# Patient Record
Sex: FEMALE | Race: BLACK OR AFRICAN AMERICAN | NOT HISPANIC OR LATINO | Employment: OTHER | URBAN - METROPOLITAN AREA
[De-identification: names, ages, dates, MRNs, and addresses within clinical notes are randomized per-mention and may not be internally consistent; named-entity substitution may affect disease eponyms.]

---

## 2017-05-01 ENCOUNTER — TELEPHONE (OUTPATIENT)
Dept: OPHTHALMOLOGY | Facility: CLINIC | Age: 67
End: 2017-05-01

## 2017-05-01 NOTE — TELEPHONE ENCOUNTER
----- Message from Sunny Bowman sent at 5/1/2017 11:30 AM CDT -----  Contact: Pancho Menchaca states pt has possible graft rejection and wants pt to be seen, if need to speak with doctor please call [phone] if not call pt at [phone],thanks

## 2017-05-17 ENCOUNTER — OFFICE VISIT (OUTPATIENT)
Dept: OPHTHALMOLOGY | Facility: CLINIC | Age: 67
End: 2017-05-17
Payer: MEDICARE

## 2017-05-17 DIAGNOSIS — H54.40 BLINDNESS, ONE EYE: Primary | ICD-10-CM

## 2017-05-17 DIAGNOSIS — H18.12 BULLOUS KERATOPATHY, LEFT: ICD-10-CM

## 2017-05-17 DIAGNOSIS — T86.8419 CORNEAL TRANSPLANT FAILURE: ICD-10-CM

## 2017-05-17 PROCEDURE — 99999 PR PBB SHADOW E&M-EST. PATIENT-LVL I: CPT | Mod: PBBFAC,,, | Performed by: OPHTHALMOLOGY

## 2017-05-17 PROCEDURE — 92004 COMPRE OPH EXAM NEW PT 1/>: CPT | Mod: S$GLB,,, | Performed by: OPHTHALMOLOGY

## 2017-05-17 RX ORDER — KETOROLAC TROMETHAMINE 5 MG/ML
SOLUTION OPHTHALMIC
COMMUNITY
Start: 2017-05-01

## 2017-05-17 RX ORDER — MOXIFLOXACIN 5 MG/ML
1 SOLUTION/ DROPS OPHTHALMIC
Status: CANCELLED | OUTPATIENT
Start: 2017-05-17

## 2017-05-17 RX ORDER — SITAGLIPTIN 25 MG/1
25 TABLET, FILM COATED ORAL EVERY MORNING
Refills: 3 | COMMUNITY
Start: 2017-05-02

## 2017-05-17 RX ORDER — LIDOCAINE HYDROCHLORIDE 10 MG/ML
1 INJECTION, SOLUTION EPIDURAL; INFILTRATION; INTRACAUDAL; PERINEURAL ONCE
Status: CANCELLED | OUTPATIENT
Start: 2017-05-17 | End: 2017-05-17

## 2017-05-17 RX ORDER — LANCETS 30 GAUGE
EACH MISCELLANEOUS
Refills: 3 | COMMUNITY
Start: 2017-05-02

## 2017-05-17 RX ORDER — OMEPRAZOLE 40 MG/1
40 CAPSULE, DELAYED RELEASE ORAL EVERY MORNING
COMMUNITY
Start: 2017-04-12

## 2017-05-17 RX ORDER — LISINOPRIL 20 MG/1
20 TABLET ORAL DAILY
COMMUNITY
Start: 2017-04-29

## 2017-05-17 RX ORDER — PREDNISOLONE ACETATE 10 MG/ML
SUSPENSION/ DROPS OPHTHALMIC
COMMUNITY
Start: 2017-05-01 | End: 2020-05-28 | Stop reason: SDUPTHER

## 2017-05-17 RX ORDER — DIGOXIN 125 MCG
0.12 TABLET ORAL DAILY
Refills: 3 | COMMUNITY
Start: 2017-02-08 | End: 2019-06-17 | Stop reason: CLARIF

## 2017-05-17 RX ORDER — PIOGLITAZONEHYDROCHLORIDE 15 MG/1
15 TABLET ORAL EVERY MORNING
Refills: 3 | COMMUNITY
Start: 2017-05-02 | End: 2019-06-17 | Stop reason: CLARIF

## 2017-05-17 RX ORDER — DIAPER,BRIEF,ADULT, DISPOSABLE
EACH MISCELLANEOUS
Refills: 3 | COMMUNITY
Start: 2017-04-03

## 2017-05-17 RX ORDER — TETRACAINE HYDROCHLORIDE 5 MG/ML
1 SOLUTION OPHTHALMIC
Status: CANCELLED | OUTPATIENT
Start: 2017-05-17

## 2017-05-17 NOTE — MR AVS SNAPSHOT
[facility] - Ophthalmology  [address]  [facility] [address]  Phone: [phone]  Fax: [phone]                  Willie M Cooks   2017 8:30 AM   Office Visit    Description:  Female : 1950   Provider:  Vishnu Magaña MD   Department:  [facility] - Ophthalmology           Reason for Visit     Eye Problem           Diagnoses this Visit        Comments    Blindness, one eye    -  Primary     Bullous keratopathy, left         Corneal transplant failure                To Do List           Goals (5 Years of Data)     None      [facility]     [facility] On [facility] Care Line -  Assistance  Unless otherwise directed by your provider, please contact [facility], our nurse care line that is available for  assistance.     Registered nurses in the [facility] provide: appointment scheduling, clinical advisement, health education, and other advisory services.  Call: [phone] (toll free)               Medications           Message regarding Medications     Verify the changes and/or additions to your medication regime listed below are the same as discussed with your clinician today.  If any of these changes or additions are incorrect, please notify your healthcare provider.             Verify that the below list of medications is an accurate representation of the medications you are currently taking.  If none reported, the list may be blank. If incorrect, please contact your healthcare provider. Carry this list with you in case of emergency.           Current Medications     amlodipine (NORVASC) 10 MG tablet Take 1 tablet by mouth.    brimonidine (ALPHAGAN) 0.2 % ophthalmic solution Place 1 drop into both eyes 2 (two) times daily.     digoxin (LANOXIN) 125 mcg tablet Take 0.125 mg by mouth once daily.    digoxin (LANOXIN) 250 mcg tablet     dorzolamide-timolol (COSOPT) 2-0.5 % ophthalmic solution Place 1 drop into both eyes 2 (two) times daily.     ergocalciferol  (DRISDOL) 8,000 unit/mL Drop Take by mouth once daily.      glipiZIDE (GLUCOTROL) 10 MG 24 hr tablet     JANUVIA 25 mg Tab Take 25 mg by mouth every morning.    ketorolac 0.5% (ACULAR) 0.5 % Drop     latanoprost (XALATAN) 0.005 % ophthalmic solution Place 1 drop into both eyes every evening.     LIPITOR 20 mg tablet     lisinopril (PRINIVIL,ZESTRIL) 20 MG tablet     lisinopril 10 MG tablet     metoprolol tartrate (LOPRESSOR) 50 MG tablet     omeprazole (PRILOSEC) 40 MG capsule     pioglitazone (ACTOS) 15 MG tablet Take 15 mg by mouth every morning.    prednisoLONE acetate (PRED FORTE) 1 % DrpS     sodium chloride (SUJATA 128) 5 % ophthalmic solution Place 1 drop into the left eye 2 (two) times daily.     SURE COMFORT LANCETS 30 gauge Misc USE AS DIRECTED    TRUETRACK TEST Strp TEST BLOOD SUGAR TWICE APPLY DAY           Clinical Reference Information           Allergies as of 5/17/2017     No Known Allergies      Immunizations Administered on Date of Encounter - 5/17/2017     None      [facility] Sign-Up     Activating your [facility] account is as easy as 1-2-3!     1) Visit [email or web address], select Sign Up Now, enter this activation code and your date of birth, then select Next.  [email or web address]  Expires: 7/1/2017  8:55 AM      2) Create a username and password to use when you visit [facility] in the future and select a security question in case you lose your password and select Next.    3) Enter your e-mail address and click Sign Up!    Additional Information  If you have questions, please e-mail [email or web address] or call [phone] to talk to our [facility] staff. Remember, [facility] is NOT to be used for urgent needs. For medical emergencies, dial 911.         Language Assistance Services     ATTENTION: Language assistance services are available, free of charge. Please call [phone].      ATENCIÓN: Si habla español, tiene a eller disposición servicios gratuitos de asistencia lingüística. Llame al  [phone].     MARY Ý: N?u b?n nói Ti?ng Vi?t, có các d?ch v? h? tr? ngôn ng? mi?n phí dành cho b?n. G?i s? [phone].         [facility] complies with applicable Federal civil rights laws and does not discriminate on the basis of race, color, national origin, age, disability, or sex.

## 2017-05-17 NOTE — PROGRESS NOTES
HPI     Eye Problem    Additional comments: Referred by Dr. Bernarda           Comments   DLS 1/2/13  Dr. Magaña    Pt states has had a sudden decrease in VA OS.  Seemed to be doing ok,   until a couple of months ago.  Unable to see much out of OS at all.    Denies pain, tearing or sensitivity to bright light    1. Bullous keratopathy OS  -s/p DSEK OS 7/2012  2. Blindness OD  3. Glaucoma OU    Eye Meds:  Brimonidine BID OU  Cosopt BID OU  Latanoprost QHS OU  PF QID OS  Ketorolac QID OS (this one burns)  Unsure if using [address] 128        Last edited by Ammy Clemente on 5/17/2017  8:33 AM. (History)            Assessment /Plan     For exam results, see Encounter Report.    Blindness, one eye - Right Eye    Bullous keratopathy, left    Corneal transplant failure    Failed DSEK graft from 2012 with 2 tubes in AC.    Clinically significant corneal edema is present, which affects vision and activities of daily living. Risks, benefits, and alternatives to surgery were discussed and patient voices understanding.    Repeat DSEK left eye

## 2017-06-08 ENCOUNTER — TELEPHONE (OUTPATIENT)
Dept: OPHTHALMOLOGY | Facility: CLINIC | Age: 67
End: 2017-06-08

## 2017-06-08 DIAGNOSIS — T86.8419 CORNEAL TRANSPLANT FAILURE: Primary | ICD-10-CM

## 2017-06-26 NOTE — PLAN OF CARE
06/26/17 1110   ED Admissions Case Approval   ED Admissions Case Approval (!) CM Approved  (OP )

## 2017-07-06 ENCOUNTER — ANESTHESIA (OUTPATIENT)
Dept: SURGERY | Facility: OTHER | Age: 67
End: 2017-07-06
Payer: MEDICARE

## 2017-07-06 ENCOUNTER — HOSPITAL ENCOUNTER (OUTPATIENT)
Facility: OTHER | Age: 67
Discharge: HOME | End: 2017-07-06
Attending: OPHTHALMOLOGY | Admitting: OPHTHALMOLOGY
Payer: MEDICARE

## 2017-07-06 ENCOUNTER — ANESTHESIA EVENT (OUTPATIENT)
Dept: SURGERY | Facility: OTHER | Age: 67
End: 2017-07-06
Payer: MEDICARE

## 2017-07-06 VITALS
OXYGEN SATURATION: 95 % | HEART RATE: 81 BPM | TEMPERATURE: 99 F | RESPIRATION RATE: 18 BRPM | DIASTOLIC BLOOD PRESSURE: 69 MMHG | WEIGHT: 175 LBS | BODY MASS INDEX: 27.47 KG/M2 | SYSTOLIC BLOOD PRESSURE: 127 MMHG | HEIGHT: 67 IN

## 2017-07-06 DIAGNOSIS — T86.8419 CORNEAL TRANSPLANT FAILURE: ICD-10-CM

## 2017-07-06 DIAGNOSIS — H54.40 BLINDNESS, ONE EYE: ICD-10-CM

## 2017-07-06 DIAGNOSIS — H18.12 BULLOUS KERATOPATHY, LEFT: ICD-10-CM

## 2017-07-06 LAB
POCT GLUCOSE: 141 MG/DL (ref 70–110)
POCT GLUCOSE: 183 MG/DL (ref 70–110)

## 2017-07-06 PROCEDURE — 71000015 HC POSTOP RECOV 1ST HR: Performed by: OPHTHALMOLOGY

## 2017-07-06 PROCEDURE — 37000009 HC ANESTHESIA EA ADD 15 MINS: Performed by: OPHTHALMOLOGY

## 2017-07-06 PROCEDURE — 63600175 PHARM REV CODE 636 W HCPCS: Performed by: ANESTHESIOLOGY

## 2017-07-06 PROCEDURE — 65757 PREP CORNEAL ENDO ALLOGRAFT: CPT | Mod: LT,,, | Performed by: OPHTHALMOLOGY

## 2017-07-06 PROCEDURE — 36000707: Performed by: OPHTHALMOLOGY

## 2017-07-06 PROCEDURE — 63600175 PHARM REV CODE 636 W HCPCS: Performed by: OPHTHALMOLOGY

## 2017-07-06 PROCEDURE — 63600175 PHARM REV CODE 636 W HCPCS: Performed by: NURSE ANESTHETIST, CERTIFIED REGISTERED

## 2017-07-06 PROCEDURE — 71000016 HC POSTOP RECOV ADDL HR: Performed by: OPHTHALMOLOGY

## 2017-07-06 PROCEDURE — 25000003 PHARM REV CODE 250: Performed by: OPHTHALMOLOGY

## 2017-07-06 PROCEDURE — 36000706: Performed by: OPHTHALMOLOGY

## 2017-07-06 PROCEDURE — 27201423 OPTIME MED/SURG SUP & DEVICES STERILE SUPPLY: Performed by: OPHTHALMOLOGY

## 2017-07-06 PROCEDURE — V2785 CORNEAL TISSUE PROCESSING: HCPCS | Performed by: OPHTHALMOLOGY

## 2017-07-06 PROCEDURE — 65756 CORNEAL TRNSPL ENDOTHELIAL: CPT | Mod: LT,,, | Performed by: OPHTHALMOLOGY

## 2017-07-06 PROCEDURE — 37000008 HC ANESTHESIA 1ST 15 MINUTES: Performed by: OPHTHALMOLOGY

## 2017-07-06 RX ORDER — MIDAZOLAM HYDROCHLORIDE 1 MG/ML
INJECTION INTRAMUSCULAR; INTRAVENOUS
Status: DISCONTINUED | OUTPATIENT
Start: 2017-07-06 | End: 2017-07-06

## 2017-07-06 RX ORDER — LIDOCAINE HYDROCHLORIDE 10 MG/ML
1 INJECTION, SOLUTION EPIDURAL; INFILTRATION; INTRACAUDAL; PERINEURAL ONCE
Status: DISCONTINUED | OUTPATIENT
Start: 2017-07-06 | End: 2017-07-06 | Stop reason: HOSPADM

## 2017-07-06 RX ORDER — CEFAZOLIN SODIUM 1 G/3ML
INJECTION, POWDER, FOR SOLUTION INTRAMUSCULAR; INTRAVENOUS
Status: DISCONTINUED | OUTPATIENT
Start: 2017-07-06 | End: 2017-07-06 | Stop reason: HOSPADM

## 2017-07-06 RX ORDER — ACETAMINOPHEN 325 MG/1
650 TABLET ORAL EVERY 4 HOURS PRN
Status: DISCONTINUED | OUTPATIENT
Start: 2017-07-06 | End: 2017-07-06 | Stop reason: HOSPADM

## 2017-07-06 RX ORDER — NAPROXEN SODIUM 220 MG/1
81 TABLET, FILM COATED ORAL DAILY
COMMUNITY

## 2017-07-06 RX ORDER — LIDOCAINE HYDROCHLORIDE 20 MG/ML
INJECTION, SOLUTION INFILTRATION; PERINEURAL
Status: DISCONTINUED | OUTPATIENT
Start: 2017-07-06 | End: 2017-07-06 | Stop reason: HOSPADM

## 2017-07-06 RX ORDER — BUPIVACAINE HYDROCHLORIDE 7.5 MG/ML
INJECTION, SOLUTION EPIDURAL; RETROBULBAR
Status: DISCONTINUED | OUTPATIENT
Start: 2017-07-06 | End: 2017-07-06 | Stop reason: HOSPADM

## 2017-07-06 RX ORDER — MOXIFLOXACIN 5 MG/ML
1 SOLUTION/ DROPS OPHTHALMIC
Status: COMPLETED | OUTPATIENT
Start: 2017-07-06 | End: 2017-07-06

## 2017-07-06 RX ORDER — HYDROCODONE BITARTRATE AND ACETAMINOPHEN 5; 325 MG/1; MG/1
1 TABLET ORAL EVERY 4 HOURS PRN
Status: DISCONTINUED | OUTPATIENT
Start: 2017-07-06 | End: 2017-07-06 | Stop reason: HOSPADM

## 2017-07-06 RX ORDER — ONDANSETRON 2 MG/ML
4 INJECTION INTRAMUSCULAR; INTRAVENOUS ONCE
Status: COMPLETED | OUTPATIENT
Start: 2017-07-06 | End: 2017-07-06

## 2017-07-06 RX ORDER — FENTANYL CITRATE 50 UG/ML
INJECTION, SOLUTION INTRAMUSCULAR; INTRAVENOUS
Status: DISCONTINUED | OUTPATIENT
Start: 2017-07-06 | End: 2017-07-06

## 2017-07-06 RX ORDER — TETRACAINE HYDROCHLORIDE 5 MG/ML
1 SOLUTION OPHTHALMIC
Status: COMPLETED | OUTPATIENT
Start: 2017-07-06 | End: 2017-07-06

## 2017-07-06 RX ADMIN — ONDANSETRON 4 MG: 2 INJECTION INTRAMUSCULAR; INTRAVENOUS at 01:07

## 2017-07-06 RX ADMIN — TETRACAINE HYDROCHLORIDE 1 DROP: 5 SOLUTION OPHTHALMIC at 10:07

## 2017-07-06 RX ADMIN — MOXIFLOXACIN HYDROCHLORIDE 1 DROP: 5 SOLUTION/ DROPS OPHTHALMIC at 10:07

## 2017-07-06 RX ADMIN — MIDAZOLAM HYDROCHLORIDE 2 MG: 1 INJECTION, SOLUTION INTRAMUSCULAR; INTRAVENOUS at 11:07

## 2017-07-06 RX ADMIN — FENTANYL CITRATE 50 MCG: 50 INJECTION, SOLUTION INTRAMUSCULAR; INTRAVENOUS at 11:07

## 2017-07-06 RX ADMIN — FENTANYL CITRATE 25 MCG: 50 INJECTION, SOLUTION INTRAMUSCULAR; INTRAVENOUS at 11:07

## 2017-07-06 NOTE — ANESTHESIA PREPROCEDURE EVALUATION
07/06/2017  Willie Babin Cooks is a 67 y.o., female.    Anesthesia Evaluation    I have reviewed the Patient Summary Reports.    I have reviewed the Nursing Notes.   I have reviewed the Medications.     Review of Systems  Anesthesia Hx:  Denies Family Hx of Anesthesia complications.   Denies Personal Hx of Anesthesia complications.   Social:  Non-Smoker    Hematology/Oncology:     Oncology Normal     Cardiovascular:   Exercise tolerance: poor Hypertension CHF    Pulmonary:  Pulmonary Normal    Hepatic/GI:   GERD    Neurological:  Neurology Normal    Endocrine:   Diabetes        Physical Exam  General:  Obesity    Airway/Jaw/Neck:  Airway Findings: Mouth Opening: Normal Tongue: Normal  General Airway Assessment: Adult      Dental:  Dental Findings: Edentulous        Mental Status:  Mental Status Findings:  Alert and Oriented, Cooperative         Anesthesia Plan  Type of Anesthesia, risks & benefits discussed:  Anesthesia Type:  MAC  Patient's Preference:   Intra-op Monitoring Plan:   Intra-op Monitoring Plan Comments:   Post Op Pain Control Plan:   Post Op Pain Control Plan Comments:   Induction:    Beta Blocker:         Informed Consent: Patient understands risks and agrees with Anesthesia plan.  Questions answered. Anesthesia consent signed with patient.  ASA Score: 3     Day of Surgery Review of History & Physical:    H&P update referred to the surgeon.         Ready For Surgery From Anesthesia Perspective.

## 2017-07-06 NOTE — H&P
Pre-op Eye Surgery H&P     CC: Painless, progressive loss of vision  Present Illness :Corneal edema/opacity  Allergies/Current Meds: see meds  Mental Status: A&O x3  Pertinent Medical History: n/a     Physical Exam  General: NAD  HEENT: Eye white/quiet  Lungs: Adequate respirations  Heart: + pulses  Abdomen: soft  Rectal/GI/: deferred     Impression: Corneal Edema/opacity  Plan:Corneal Transplant

## 2017-07-06 NOTE — PLAN OF CARE
Patient prefers to have Daughter Celsa present for discharge teaching. Please contact them @[phone].

## 2017-07-06 NOTE — ANESTHESIA POSTPROCEDURE EVALUATION
"Anesthesia Post Evaluation    Patient: Willie Babin Cooks    Procedure(s) Performed: Procedure(s) (LRB):  TRANSPLANT-CORNEA/DSEK (Left)    Final Anesthesia Type: MAC  Patient location during evaluation: [facility]  Patient participation: Yes- Able to Participate  Level of consciousness: awake and alert  Post-procedure vital signs: reviewed and stable  Pain management: adequate  Airway patency: patent  PONV status at discharge: No PONV  Anesthetic complications: no      Cardiovascular status: blood pressure returned to baseline  Respiratory status: unassisted and spontaneous ventilation  Hydration status: euvolemic  Follow-up not needed.        Visit Vitals  BP (!) 140/76   Pulse 76   Temp 37.2 °C (99 °F) (Oral)   Resp 16   Ht 5' 7" (1.702 m)   Wt 79.4 kg (175 lb)   SpO2 99%   Breastfeeding? No   BMI 27.41 kg/m²       Pain/Nayeli Score: Pain Assessment Performed: Yes (7/6/2017 10:10 AM)      "

## 2017-07-06 NOTE — OR NURSING
Patient nauseous. Spoke with MD and informed him that we briefly sat patient up; states okay. MD at bedside. BSG of 183. States feels better.

## 2017-07-06 NOTE — DISCHARGE INSTRUCTIONS
Vishnu Magaña MD  [facility]  Department of Ophthalmology    DSEK CORNEAL TRANSPLANT Post-Operative Instructions:  Remain lying on your back , facing the ceiling for the first 48 hours after surgery, except for necessary breaks such as eating meals and restroom breaks.  AVOID watching TV or any sudden jerking movements of your head  If you experience pain or severe headache behind the eye with nausea, call Dr. Magaña or the on-call doctor IMMEDIATELY @ [phone].    Plan to see Dr. Magaña tomorrow at the eye clinic:   [address] [facility],[address]     STARTING Tomorrow following your post-operative appointment with Dr. Magaña:   Place 1 (one) drop of each of the medications into the operative eye as directed, wait 2 (two) minutes between drops.   Remove the shield covering from your eye temporarily to instill the drops.     SHAKE BOTTLES WELL BEFORE USE:    OCUFLOX/ VIGAMOX:       4 (four) times a day  FOR 1 (one) WEEK.                PRED ACETATE:        4 (four) times a day for 1 (one)  month                  Precautions:  DO NOT rub your eye.  Remain on your back for the next 24 hours.   Do NOT exert yourself ( no heavy lifting, running, or swimming)  for 1 (One) Month.  You may shower, but do not allow water into your eye for 2 (two) weeks.  Wear protective sunglasses during the day and a shield at night for 1(one) week.      Anesthesia: Monitored Anesthesia Care (MAC)    Anesthesia Safety  · Have an adult family member or friend drive you home after the procedure.  · For the first 24 hours after your surgery:  ¨ Do not drive or use heavy equipment.  ¨ Do not make important decisions or sign documents.  ¨ Avoid alcohol.  ¨ Have someone stay with you, if possible. They can watch for problems and help keep you safe.

## 2017-07-06 NOTE — OP NOTE
SURGEON:  Vishnu Magaña M.D.    PREOPERATIVE DIAGNOSES:  Corneal edema  Failed DSEK OS    POSTOPERATIVE DIAGNOSES:    Corneal edema  Failed DSEK OS    PROCEDURES PERFORMED:  Descement's Stripping Endothelial Keratoplasty  left eye (DSEK) (45304)    07/06/2017    ANESTHESIA:  MAC with retrobulbar block.    GRAFT SIZE:  7.5 mm    COMPLICATIONS:  None.    INDICATIONS:    The patient has a history poor vision secondary to corneal edema.  After a thorough discussion of the risks, benefits and alternatives to corneal transplantation using the DSEK technique, the patient voices understanding of the risks and benefits and wishes to proceed with surgery.    PROCEDURE IN DETAIL:    The patient was brought to the operating room in the supine position, where the eye was prepped and draped in standard sterile fashion, after having received a retrobulbar block consisting of a 50/50 mixture of lidocaine and bupivacaine under conscious sedation.  The procedure was begun by the creation of a paracentesis incision, through which viscoelastic was used to fill the anterior chamber.    Next, a 4mm temporal limbal tunnel incision was constructed.  An 8 mm iliana was made in the center of the cornea and then reverse Sinskey used to remove the old graft.  A scraper was then used to remove Descemet's in its entirety and all remaining viscoelastics were removed from the eye.    Attention was then directed to the side table, where the previously microkeratome cut donor tissue was trephined with a Hessburg-Snider trephine.  Attention was then redirected to the patient's eye and this corneal transplant tissue was   inserted into the anterior chamber using an injector.  The anterior chamber was reformed with BSS and 10-0 nylon sutures placed in the wound.  The DSEK button was repositioned centrally and air used to fill the anterior chamber.  Ten minutes were allowed to elapse for adherence, and then the air was replaced with BSS except for a small  air bubble.  The patient will remain supine in the postoperative recovery area for one hour to allow better adhesion of the graft to the posterior aspect of the cornea.  The patient  will be seen tomorrow in the eye clinic.

## 2017-07-06 NOTE — DISCHARGE SUMMARY
Outcome: Successful outpatient ophthalmic surgical procedure  Preprinted Instructions given to patient.  Regular diet.  Activity: No restrictions  Meds: see Med Rec  Condition: stable  Follow up: 1 day with Dr Magaña  Disposition: Home  Diagnosis: s/p eye surgery

## 2017-07-07 ENCOUNTER — OFFICE VISIT (OUTPATIENT)
Dept: OPHTHALMOLOGY | Facility: CLINIC | Age: 67
End: 2017-07-07
Payer: MEDICARE

## 2017-07-07 DIAGNOSIS — Z94.7 STATUS POST CORNEAL TRANSPLANT: ICD-10-CM

## 2017-07-07 DIAGNOSIS — Z98.890 POST-OPERATIVE STATE: Primary | ICD-10-CM

## 2017-07-07 PROCEDURE — 99999 PR PBB SHADOW E&M-EST. PATIENT-LVL III: CPT | Mod: PBBFAC,,, | Performed by: OPHTHALMOLOGY

## 2017-07-07 PROCEDURE — 99024 POSTOP FOLLOW-UP VISIT: CPT | Mod: S$GLB,,, | Performed by: OPHTHALMOLOGY

## 2017-07-07 NOTE — PROGRESS NOTES
HPI     Post-op Evaluation    Additional comments: POD 1 DSEK OS           Comments   POD 1 DSEK OS July 6, 2017    Patient presented to clinic with OS patched.  Patched removed at 9:24 AM.    Patch removal tolerated well.    No eye meds at this time; but patient has medications and is ready to   start regimen           Last edited by Jesika Simmons on 7/7/2017  9:24 AM. (History)            Assessment /Plan     For exam results, see Encounter Report.    Post-operative state    Status post corneal transplant      POD 1 DSEK OS July 6, 2017    Doing well, graft attached    Restart glc gtts as before, add PF / vigamox QID    F/up 1 wk Dr. Magaña

## 2017-07-12 ENCOUNTER — OFFICE VISIT (OUTPATIENT)
Dept: OPHTHALMOLOGY | Facility: CLINIC | Age: 67
End: 2017-07-12
Payer: MEDICARE

## 2017-07-12 DIAGNOSIS — Z98.890 POST-OPERATIVE STATE: Primary | ICD-10-CM

## 2017-07-12 PROCEDURE — 99024 POSTOP FOLLOW-UP VISIT: CPT | Mod: S$GLB,,, | Performed by: OPHTHALMOLOGY

## 2017-07-12 PROCEDURE — 99999 PR PBB SHADOW E&M-EST. PATIENT-LVL II: CPT | Mod: PBBFAC,,, | Performed by: OPHTHALMOLOGY

## 2017-07-12 NOTE — PROGRESS NOTES
HPI     POW 1 DSEK OS July 6, 2017    MEDS:    PF QID OS  Vigamox QID OS    Patient states she is doing well with no complaints.      Last edited by Jesika Simmons on 7/12/2017 11:03 AM. (History)            Assessment /Plan     For exam results, see Encounter Report.    Post-operative state      DSEK graft attached and clear. Signs and symptoms of graft rejection reviewed.  1 week  Looks great.  PF qid

## 2017-08-08 NOTE — PROGRESS NOTES
HPI     Post-op Evaluation    Additional comments: POD 1 DSEK OS           Comments   POD 1 DSEK OS July 6, 2017    Patient presented to clinic with OS patched.  Patched removed at 9:24 AM.    Patch removal tolerated well.    No eye meds at this time; but patient has medications and is ready to   start regimen           Last edited by Jesika Simmons on 7/7/2017  9:24 AM. (History)            Assessment /Plan     For exam results, see Encounter Report.    Post-operative state    Status post corneal transplant        HPI     Post-op Evaluation    Additional comments: POD 1 DSEK OS           Comments   POD 1 DSEK OS July 6, 2017     Patient presented to clinic with OS patched.  Patched removed at 9:24 AM.    Patch removal tolerated well.     No eye meds at this time; but patient has medications and is ready to   start regimen            Last edited by Jesika Simmons on 7/7/2017  9:24 AM. (History)               Assessment /Plan      For exam results, see Encounter Report.     Post-operative state     Status post corneal transplant        POD 1 DSEK OS July 6, 2017     Doing well, graft attached     Restart glc gtts as before, add PF / vigamox QID     F/up 1 wk Dr. Magaña

## 2017-09-06 ENCOUNTER — OFFICE VISIT (OUTPATIENT)
Dept: OPHTHALMOLOGY | Facility: CLINIC | Age: 67
End: 2017-09-06
Payer: MEDICARE

## 2017-09-06 DIAGNOSIS — H54.40 BLINDNESS, ONE EYE: ICD-10-CM

## 2017-09-06 DIAGNOSIS — Z98.890 POST-OPERATIVE STATE: Primary | ICD-10-CM

## 2017-09-06 DIAGNOSIS — Z94.7 STATUS POST CORNEAL TRANSPLANT: ICD-10-CM

## 2017-09-06 PROCEDURE — 92014 COMPRE OPH EXAM EST PT 1/>: CPT | Mod: 24,S$GLB,, | Performed by: OPHTHALMOLOGY

## 2017-09-06 PROCEDURE — 99999 PR PBB SHADOW E&M-EST. PATIENT-LVL III: CPT | Mod: PBBFAC,,, | Performed by: OPHTHALMOLOGY

## 2017-09-06 RX ORDER — BROMFENAC 1.03 MG/ML
1 SOLUTION/ DROPS OPHTHALMIC 2 TIMES DAILY
Qty: 2.5 ML | Refills: 3 | Status: SHIPPED | OUTPATIENT
Start: 2017-09-06 | End: 2017-10-06

## 2017-09-06 NOTE — PROGRESS NOTES
HPI     Post-op Evaluation    Additional comments: 6 wk check.            Comments   S/p Repeat DSEK OS 07/06/2017  Hx of Bullous Keratopathy and Failed Graft OS    Pt states vision OS is still blurry.  Saw Dr Diaz recently who put her on   Ketorolac but she is not using because it burns.      MEDS:    PF QID OS  Brimonidine bid OU  Latanoprost qhs OS  Cosopt bid OS     Patient states she is doing well with no complaints.         Last edited by Usha Longoria on 9/6/2017  9:03 AM. (History)            Assessment /Plan     For exam results, see Encounter Report.    Post-operative state    Status post corneal transplant    Blindness, one eye - Right Eye    Other orders  -     nepafenac (ILEVRO) 0.3 % DrpS; Place 1 drop into the left eye once daily. For 30 days  Dispense: 1.7 mL; Refill: 3  -     bromfenac (XIBROM) 0.09 % ophthalmic solution; Place 1 drop into the left eye 2 (two) times daily.  Dispense: 2.5 mL; Refill: 3  -     nepafenac (NEVANAC) 0.1 % ophthalmic suspension; Place 1 drop into the left eye 3 (three) times daily.  Dispense: 6 mL; Refill: 3      DSEK graft attached and clear. Signs and symptoms of graft rejection reviewed.  Excellent result. LImited potential.  Continue current treatment/medications

## 2018-01-22 ENCOUNTER — TELEPHONE (OUTPATIENT)
Dept: OPHTHALMOLOGY | Facility: CLINIC | Age: 68
End: 2018-01-22

## 2018-01-22 NOTE — TELEPHONE ENCOUNTER
----- Message from Sally Mcconnell sent at 1/22/2018 10:58 AM CST -----  Contact: Celsa Wong(Daughter)  Pt called to speak with a nurse about her mother's current condition. The RIGHT EYE has continued  blurred vision. May need prescription changes. Ms. Wong can be reached at [phone] and mother can be reached at [phone].

## 2018-01-22 NOTE — TELEPHONE ENCOUNTER
I spoke to Ms Celsa.  She states that the left eye has been watering and vision is blurry for some time.  Appointment scheduled for evaluation with Dr Magaña for 02/02 at 930 am.

## 2018-02-02 ENCOUNTER — OFFICE VISIT (OUTPATIENT)
Dept: OPHTHALMOLOGY | Facility: CLINIC | Age: 68
End: 2018-02-02
Payer: MEDICARE

## 2018-02-02 DIAGNOSIS — H54.40 BLINDNESS, ONE EYE: Primary | ICD-10-CM

## 2018-02-02 DIAGNOSIS — Z94.7 STATUS POST CORNEAL TRANSPLANT: ICD-10-CM

## 2018-02-02 PROCEDURE — 92014 COMPRE OPH EXAM EST PT 1/>: CPT | Mod: S$GLB,,, | Performed by: OPHTHALMOLOGY

## 2018-02-02 PROCEDURE — 99999 PR PBB SHADOW E&M-EST. PATIENT-LVL II: CPT | Mod: PBBFAC,,, | Performed by: OPHTHALMOLOGY

## 2018-02-02 NOTE — PROGRESS NOTES
HPI     Eye Problem    Additional comments: Left            Comments   /p Repeat DSEK OS 07/06/2017   Hx of Bullous Keratopathy and Failed Graft OS     Pt states OS has been watering, painful off and on, and blurred vision for   some time.  Sees Dr Diaz and Dr Winston and they have decreased Pred to   once daily but unsure when this change occurred.         Brimonidine bid OU   Pred qd OS -   Cosopt (generic) bid OU   Latanoprost qhs OU        Last edited by Usha Longoria on 2/2/2018  9:29 AM. (History)            Assessment /Plan     For exam results, see Encounter Report.    Blindness, one eye - Right Eye    Status post corneal transplant      DSEK graft attached and clear. Signs and symptoms of graft rejection reviewed.  Optic nerve pale.  IOP stable.

## 2018-11-16 ENCOUNTER — OFFICE VISIT (OUTPATIENT)
Dept: OPHTHALMOLOGY | Facility: CLINIC | Age: 68
End: 2018-11-16
Payer: MEDICARE

## 2018-11-16 DIAGNOSIS — T86.8419 CORNEAL TRANSPLANT FAILURE: ICD-10-CM

## 2018-11-16 DIAGNOSIS — H54.40 BLINDNESS, ONE EYE: Primary | ICD-10-CM

## 2018-11-16 PROCEDURE — 99999 PR PBB SHADOW E&M-EST. PATIENT-LVL II: CPT | Mod: PBBFAC,,, | Performed by: OPHTHALMOLOGY

## 2018-11-16 PROCEDURE — 92014 COMPRE OPH EXAM EST PT 1/>: CPT | Mod: S$GLB,,, | Performed by: OPHTHALMOLOGY

## 2018-11-16 RX ORDER — PREDNISOLONE ACETATE 10 MG/ML
1 SUSPENSION/ DROPS OPHTHALMIC 4 TIMES DAILY
Qty: 10 ML | Refills: 4 | Status: SHIPPED | OUTPATIENT
Start: 2018-11-16 | End: 2019-05-01 | Stop reason: SDUPTHER

## 2018-11-16 NOTE — PROGRESS NOTES
HPI     Decreased Visual Acuity      Additional comments: Left Eye.               Comments     67 y/o female presents for corneal graft rejection per Dr Menchaca.   Pt states vision OS is very cloudy.  Has been for some time.  Likely since   the summer.  Had been using Pred once daily but saw Dr Rodrigues last   week who increased it to q2h.  Pt notes no change in vision since that   exam.    Pred q2h OS  Brimonidine tid OU  Latanoprost qhs OU   Dorzolamide bid OU           Last edited by Usha Longoria on 11/16/2018  8:51 AM. (History)            Assessment /Plan     For exam results, see Encounter Report.    Blindness, one eye - Right Eye    Corneal transplant failure      Advanced glaucoma   s/p DSEK OS 7.17  Moderate graft edema, but holding for now.  Decreased cell count, but monitor for now,   Would likely need PKP if repeat required.

## 2019-01-16 ENCOUNTER — OFFICE VISIT (OUTPATIENT)
Dept: OPHTHALMOLOGY | Facility: CLINIC | Age: 69
End: 2019-01-16
Payer: MEDICARE

## 2019-01-16 DIAGNOSIS — H54.40 BLINDNESS, ONE EYE: Primary | ICD-10-CM

## 2019-01-16 DIAGNOSIS — T86.8419 CORNEAL TRANSPLANT FAILURE: ICD-10-CM

## 2019-01-16 PROCEDURE — 92014 PR EYE EXAM, EST PATIENT,COMPREHESV: ICD-10-PCS | Mod: S$GLB,,, | Performed by: OPHTHALMOLOGY

## 2019-01-16 PROCEDURE — 99999 PR PBB SHADOW E&M-EST. PATIENT-LVL II: CPT | Mod: PBBFAC,,, | Performed by: OPHTHALMOLOGY

## 2019-01-16 PROCEDURE — 92014 COMPRE OPH EXAM EST PT 1/>: CPT | Mod: S$GLB,,, | Performed by: OPHTHALMOLOGY

## 2019-01-16 PROCEDURE — 99999 PR PBB SHADOW E&M-EST. PATIENT-LVL II: ICD-10-PCS | Mod: PBBFAC,,, | Performed by: OPHTHALMOLOGY

## 2019-01-16 NOTE — PROGRESS NOTES
"HPI     DLS: 11/16/18    PT here for 2 month check;  Pt states she went to see her eye doctor in [facility] last week and he   stated that her pressures were fine and that the cause of decreased vision   is the transplant. Pt states vision is just "white".     Meds: Brimonidine bid ou             Dorzolamide-Timolol bid ou             Prednisolone qid os              Latanoprost qhs ou      Last edited by Stephanie Gaffney on 1/16/2019  8:36 AM. (History)            Assessment /Plan     For exam results, see Encounter Report.    Blindness, one eye - Right Eye    Corneal transplant failure      Progressive DSEK failure OS with inferior fibrosis from Tube.  Will plan on PKP OS in near future. Currently, still some useful vision through center of graft.  Likely will plan PKP OS in summer/fall.   Recheck in 2-3 mos.                   "

## 2019-01-16 NOTE — PATIENT INSTRUCTIONS
Progressive DSEK failure OS with inferior fibrosis from Tube.  Will plan on PKP OS in near future. Currently, still some useful vision through center of graft.  Likely will plan PKP OS in summer/fall.   Recheck in 2-3 mos.

## 2019-01-18 ENCOUNTER — TELEPHONE (OUTPATIENT)
Dept: OPHTHALMOLOGY | Facility: CLINIC | Age: 69
End: 2019-01-18

## 2019-01-18 NOTE — TELEPHONE ENCOUNTER
----- Message from Randa Cornell sent at 1/18/2019 10:19 AM CST -----  Contact: Cooks,Willie Babin   Pt daughter   would like to speak with  nurse please ,pt has a questions, she can be reached at [phone] please thank you.

## 2019-01-18 NOTE — TELEPHONE ENCOUNTER
Informed patient's daughter I will send in a referral to [facility]. Also explained it may take up to a week or two for them to get in touch with them. Daughter indicates understanding.

## 2019-04-22 ENCOUNTER — TELEPHONE (OUTPATIENT)
Dept: OPHTHALMOLOGY | Facility: CLINIC | Age: 69
End: 2019-04-22

## 2019-04-22 NOTE — TELEPHONE ENCOUNTER
Pt's daughter states her mom's vision is getting progressively worse. Made appt earlier than first available.

## 2019-04-22 NOTE — TELEPHONE ENCOUNTER
----- Message from Randa Cornell sent at 4/22/2019  4:22 PM CDT -----  Contact: Cooks,Willie Babin   Pt daughter Celsa Green would like to speak with  nurse,to rescheduled the appointment please ,she can be reached at [phone] or cell# [phone]  please thank you.

## 2019-05-01 ENCOUNTER — OFFICE VISIT (OUTPATIENT)
Dept: OPHTHALMOLOGY | Facility: CLINIC | Age: 69
End: 2019-05-01
Payer: MEDICARE

## 2019-05-01 DIAGNOSIS — H54.40 BLINDNESS, ONE EYE: ICD-10-CM

## 2019-05-01 DIAGNOSIS — T86.8419 CORNEAL TRANSPLANT FAILURE: Primary | ICD-10-CM

## 2019-05-01 PROCEDURE — 92014 COMPRE OPH EXAM EST PT 1/>: CPT | Mod: S$GLB,,, | Performed by: OPHTHALMOLOGY

## 2019-05-01 PROCEDURE — 99999 PR PBB SHADOW E&M-EST. PATIENT-LVL II: CPT | Mod: PBBFAC,,, | Performed by: OPHTHALMOLOGY

## 2019-05-01 PROCEDURE — 92014 PR EYE EXAM, EST PATIENT,COMPREHESV: ICD-10-PCS | Mod: S$GLB,,, | Performed by: OPHTHALMOLOGY

## 2019-05-01 PROCEDURE — 99999 PR PBB SHADOW E&M-EST. PATIENT-LVL II: ICD-10-PCS | Mod: PBBFAC,,, | Performed by: OPHTHALMOLOGY

## 2019-05-01 RX ORDER — LORATADINE 10 MG/1
TABLET ORAL
Refills: 10 | COMMUNITY
Start: 2019-03-26 | End: 2019-06-17 | Stop reason: CLARIF

## 2019-05-01 RX ORDER — TIMOLOL MALEATE 5 MG/ML
SOLUTION/ DROPS OPHTHALMIC
Refills: 3 | COMMUNITY
Start: 2019-02-28

## 2019-05-01 RX ORDER — ERGOCALCIFEROL 1.25 MG/1
50000 CAPSULE ORAL
Refills: 3 | COMMUNITY
Start: 2019-03-08

## 2019-05-01 RX ORDER — PREDNISOLONE ACETATE 10 MG/ML
1 SUSPENSION/ DROPS OPHTHALMIC 4 TIMES DAILY
Qty: 10 ML | Refills: 4 | Status: ON HOLD | OUTPATIENT
Start: 2019-05-01 | End: 2019-06-20 | Stop reason: SDUPTHER

## 2019-05-01 RX ORDER — TETRACAINE HYDROCHLORIDE 5 MG/ML
1 SOLUTION OPHTHALMIC
Status: CANCELLED | OUTPATIENT
Start: 2019-05-01

## 2019-05-01 RX ORDER — DORZOLAMIDE HCL 20 MG/ML
SOLUTION/ DROPS OPHTHALMIC
Refills: 3 | COMMUNITY
Start: 2019-01-31

## 2019-05-01 RX ORDER — MOXIFLOXACIN 5 MG/ML
1 SOLUTION/ DROPS OPHTHALMIC
Status: CANCELLED | OUTPATIENT
Start: 2019-05-01

## 2019-05-01 RX ORDER — OFLOXACIN 3 MG/ML
1 SOLUTION/ DROPS OPHTHALMIC 4 TIMES DAILY
Qty: 5 ML | Refills: 3 | Status: SHIPPED | OUTPATIENT
Start: 2019-05-01 | End: 2019-05-11

## 2019-05-01 RX ORDER — CHLORTHALIDONE 25 MG/1
25 TABLET ORAL DAILY
Refills: 3 | COMMUNITY
Start: 2019-03-21

## 2019-05-01 NOTE — PROGRESS NOTES
HPI     Pts daughter states her mother has been having pain in her eyes ou. Pt   states her eyes tear a lot. No other complaints.    Meds: Brimonidine bid ou             Dorzolamide-Timolol bid ou             Prednisolone qid os              Latanoprost qhs ou      Last edited by Erin Barros on 5/1/2019 11:31 AM. (History)            Assessment /Plan     For exam results, see Encounter Report.    Corneal transplant failure    Blindness, one eye - Right Eye        Progressive DSEK failure OS with inferior fibrosis from Tube.  Will plan on PKP OS  Discussed risks, benefits, and alternatives to surgical intervention. Patient voices understanding and wishes to proceed.  Monocular, do discussed possibility of blindness  Inferior tube, so care with David Ring

## 2019-05-03 ENCOUNTER — TELEPHONE (OUTPATIENT)
Dept: OPHTHALMOLOGY | Facility: CLINIC | Age: 69
End: 2019-05-03

## 2019-05-03 DIAGNOSIS — T86.8419 CORNEAL TRANSPLANT FAILURE: Primary | ICD-10-CM

## 2019-05-03 RX ORDER — PREDNISOLONE ACETATE 10 MG/ML
SUSPENSION/ DROPS OPHTHALMIC
Qty: 10 ML | Refills: 4 | OUTPATIENT
Start: 2019-05-03

## 2019-06-18 ENCOUNTER — TELEPHONE (OUTPATIENT)
Dept: OPHTHALMOLOGY | Facility: CLINIC | Age: 69
End: 2019-06-18

## 2019-06-18 NOTE — TELEPHONE ENCOUNTER
Left Message giving arrival time for surgery as 9:30 am.  Nothing to eat or drink after 9 pm night before.  May have water/gatorade/powerade from 9 pm until leaves house morning of surgery.

## 2019-06-19 ENCOUNTER — TELEPHONE (OUTPATIENT)
Dept: OPHTHALMOLOGY | Facility: CLINIC | Age: 69
End: 2019-06-19

## 2019-06-19 NOTE — TELEPHONE ENCOUNTER
Patient given arrival time for surgery as 9:30 am.  Nothing to eat or drink after 9 pm night before.  May have water/gatorade/powerade from 9 pm until leaves house morning of surgery.

## 2019-06-20 ENCOUNTER — TELEPHONE (OUTPATIENT)
Dept: OPHTHALMOLOGY | Facility: CLINIC | Age: 69
End: 2019-06-20

## 2019-06-20 ENCOUNTER — ANESTHESIA EVENT (OUTPATIENT)
Dept: SURGERY | Facility: OTHER | Age: 69
End: 2019-06-20
Payer: MEDICARE

## 2019-06-20 ENCOUNTER — HOSPITAL ENCOUNTER (OUTPATIENT)
Facility: OTHER | Age: 69
Discharge: HOME | End: 2019-06-20
Attending: OPHTHALMOLOGY | Admitting: OPHTHALMOLOGY
Payer: MEDICARE

## 2019-06-20 ENCOUNTER — ANESTHESIA (OUTPATIENT)
Dept: SURGERY | Facility: OTHER | Age: 69
End: 2019-06-20
Payer: MEDICARE

## 2019-06-20 VITALS
SYSTOLIC BLOOD PRESSURE: 124 MMHG | TEMPERATURE: 97 F | HEIGHT: 66 IN | BODY MASS INDEX: 32.14 KG/M2 | OXYGEN SATURATION: 96 % | HEART RATE: 74 BPM | WEIGHT: 200 LBS | DIASTOLIC BLOOD PRESSURE: 67 MMHG | RESPIRATION RATE: 18 BRPM

## 2019-06-20 DIAGNOSIS — T86.8419 CORNEAL TRANSPLANT FAILURE: Primary | ICD-10-CM

## 2019-06-20 LAB — POCT GLUCOSE: 146 MG/DL (ref 70–110)

## 2019-06-20 PROCEDURE — 37000008 HC ANESTHESIA 1ST 15 MINUTES: Performed by: OPHTHALMOLOGY

## 2019-06-20 PROCEDURE — 88304 TISSUE EXAM BY PATHOLOGIST: CPT | Performed by: PATHOLOGY

## 2019-06-20 PROCEDURE — 37000009 HC ANESTHESIA EA ADD 15 MINS: Performed by: OPHTHALMOLOGY

## 2019-06-20 PROCEDURE — 27201423 OPTIME MED/SURG SUP & DEVICES STERILE SUPPLY: Performed by: OPHTHALMOLOGY

## 2019-06-20 PROCEDURE — S0020 INJECTION, BUPIVICAINE HYDRO: HCPCS | Performed by: OPHTHALMOLOGY

## 2019-06-20 PROCEDURE — 88304 TISSUE EXAM BY PATHOLOGIST: CPT | Mod: 26,,, | Performed by: PATHOLOGY

## 2019-06-20 PROCEDURE — 63600175 PHARM REV CODE 636 W HCPCS: Performed by: OPHTHALMOLOGY

## 2019-06-20 PROCEDURE — 65730 PR CORNEAL TRANSPLANT,PENETRATING: ICD-10-PCS | Mod: LT,,, | Performed by: OPHTHALMOLOGY

## 2019-06-20 PROCEDURE — 88304 TISSUE SPECIMEN TO PATHOLOGY - SURGERY: ICD-10-PCS | Mod: 26,,, | Performed by: PATHOLOGY

## 2019-06-20 PROCEDURE — 63600175 PHARM REV CODE 636 W HCPCS: Performed by: ANESTHESIOLOGY

## 2019-06-20 PROCEDURE — 63600175 PHARM REV CODE 636 W HCPCS: Performed by: NURSE ANESTHETIST, CERTIFIED REGISTERED

## 2019-06-20 PROCEDURE — 71000015 HC POSTOP RECOV 1ST HR: Performed by: OPHTHALMOLOGY

## 2019-06-20 PROCEDURE — 25000003 PHARM REV CODE 250: Performed by: OPHTHALMOLOGY

## 2019-06-20 PROCEDURE — 65730 CORNEAL TRANSPLANT: CPT | Mod: LT,,, | Performed by: OPHTHALMOLOGY

## 2019-06-20 PROCEDURE — 36000707: Performed by: OPHTHALMOLOGY

## 2019-06-20 PROCEDURE — V2785 CORNEAL TISSUE PROCESSING: HCPCS | Performed by: OPHTHALMOLOGY

## 2019-06-20 PROCEDURE — 71000016 HC POSTOP RECOV ADDL HR: Performed by: OPHTHALMOLOGY

## 2019-06-20 PROCEDURE — 36000706: Performed by: OPHTHALMOLOGY

## 2019-06-20 RX ORDER — MOXIFLOXACIN 5 MG/ML
1 SOLUTION/ DROPS OPHTHALMIC
Status: COMPLETED | OUTPATIENT
Start: 2019-06-20 | End: 2019-06-20

## 2019-06-20 RX ORDER — LIDOCAINE HYDROCHLORIDE 20 MG/ML
INJECTION, SOLUTION INFILTRATION; PERINEURAL
Status: DISCONTINUED | OUTPATIENT
Start: 2019-06-20 | End: 2019-06-20 | Stop reason: HOSPADM

## 2019-06-20 RX ORDER — CEFAZOLIN SODIUM 1 G/3ML
INJECTION, POWDER, FOR SOLUTION INTRAMUSCULAR; INTRAVENOUS
Status: DISCONTINUED | OUTPATIENT
Start: 2019-06-20 | End: 2019-06-20 | Stop reason: HOSPADM

## 2019-06-20 RX ORDER — BUPIVACAINE HYDROCHLORIDE 7.5 MG/ML
INJECTION, SOLUTION EPIDURAL; RETROBULBAR
Status: DISCONTINUED | OUTPATIENT
Start: 2019-06-20 | End: 2019-06-20 | Stop reason: HOSPADM

## 2019-06-20 RX ORDER — ONDANSETRON 2 MG/ML
4 INJECTION INTRAMUSCULAR; INTRAVENOUS ONCE
Status: COMPLETED | OUTPATIENT
Start: 2019-06-20 | End: 2019-06-20

## 2019-06-20 RX ORDER — MIDAZOLAM HYDROCHLORIDE 1 MG/ML
INJECTION INTRAMUSCULAR; INTRAVENOUS
Status: DISCONTINUED | OUTPATIENT
Start: 2019-06-20 | End: 2019-06-20

## 2019-06-20 RX ORDER — ACETAMINOPHEN 325 MG/1
650 TABLET ORAL EVERY 4 HOURS PRN
Status: DISCONTINUED | OUTPATIENT
Start: 2019-06-20 | End: 2019-06-20 | Stop reason: HOSPADM

## 2019-06-20 RX ORDER — FENTANYL CITRATE 50 UG/ML
INJECTION, SOLUTION INTRAMUSCULAR; INTRAVENOUS
Status: DISCONTINUED | OUTPATIENT
Start: 2019-06-20 | End: 2019-06-20

## 2019-06-20 RX ORDER — HYDROCODONE BITARTRATE AND ACETAMINOPHEN 5; 325 MG/1; MG/1
1 TABLET ORAL EVERY 4 HOURS PRN
Status: DISCONTINUED | OUTPATIENT
Start: 2019-06-20 | End: 2019-06-20 | Stop reason: HOSPADM

## 2019-06-20 RX ORDER — TETRACAINE HYDROCHLORIDE 5 MG/ML
1 SOLUTION OPHTHALMIC
Status: COMPLETED | OUTPATIENT
Start: 2019-06-20 | End: 2019-06-20

## 2019-06-20 RX ORDER — DEXAMETHASONE SODIUM PHOSPHATE 4 MG/ML
INJECTION, SOLUTION INTRA-ARTICULAR; INTRALESIONAL; INTRAMUSCULAR; INTRAVENOUS; SOFT TISSUE
Status: DISCONTINUED | OUTPATIENT
Start: 2019-06-20 | End: 2019-06-20 | Stop reason: HOSPADM

## 2019-06-20 RX ORDER — OFLOXACIN 3 MG/ML
1 SOLUTION/ DROPS OPHTHALMIC 4 TIMES DAILY
COMMUNITY
End: 2020-06-23

## 2019-06-20 RX ADMIN — MIDAZOLAM HYDROCHLORIDE 1 MG: 1 INJECTION, SOLUTION INTRAMUSCULAR; INTRAVENOUS at 12:06

## 2019-06-20 RX ADMIN — FENTANYL CITRATE 50 MCG: 50 INJECTION, SOLUTION INTRAMUSCULAR; INTRAVENOUS at 12:06

## 2019-06-20 RX ADMIN — MOXIFLOXACIN HYDROCHLORIDE 1 DROP: 5 SOLUTION/ DROPS OPHTHALMIC at 11:06

## 2019-06-20 RX ADMIN — TETRACAINE HYDROCHLORIDE 1 DROP: 5 SOLUTION OPHTHALMIC at 11:06

## 2019-06-20 RX ADMIN — ONDANSETRON 4 MG: 2 INJECTION INTRAMUSCULAR; INTRAVENOUS at 02:06

## 2019-06-20 NOTE — OP NOTE
SURGEON:  Vishnu Magaña M.D.    PREOPERATIVE DIAGNOSIS:    1) Failed corneal transplant OS  2) COrneal Opacity    POSTOPERATIVE DIAGNOSIS:     1) Failed corneal transplant OS  2) COrneal Opacity    PROCEDURE:    1. Penetrating keratoplasty, left eye  2.     ANESTHESIA:  RBB      DATE OF SURGERY:  06/20/2019      GRAFT SIZE:  8.5 mm donor button into a   8.0 mm host trephination      COMPLICATIONS:  None.    BLOOD LOSS: Less than 5 cc    SPECIMENS:   1) Cornea     INDICATIONS FOR PROCEDURE :       After a thorough discussion of risks, benefits and alternatives, including, but not limited to, infection, severe hemorrhage, graft failure, need for repeat transplantation, loss of vision, and loss of the eye,  the patient voices understanding and desires to proceed with corneal transplantation.    PROCEDURE IN DETAIL:    The patient was brought to the operating room in supine position where anesthesia was achieved without complication.  Next, the eye was prepped and draped in standard sterile fashion with 5% Betadine and a lid speculum placed in the eye.  The procedure was begun by marking the center of the cornea and sizers  used to determine the necessary size of the grafts.    Attention was then directed to the side table where a donor punch was used to cut the donor tissue.  Attention was then redirected to the patient's eye where a  trephine and an Eitan PKP nafisa blade were used to excise the patient's cornea.  The donor button was then sewn into place with 10-0 nylon using 8 interrupted sutures and a 16 x running.  All remaining viscoelastics were removed from the anterior chamber.  The eye was reformed with BSS and wound integrity verified.  Subconjunctival injections of antibiotic and steroid were administered and a patch placed over the eye. The patient will follow up in the eye clinic tomorrow with Dr. Magaña.

## 2019-06-20 NOTE — ANESTHESIA POSTPROCEDURE EVALUATION
Anesthesia Post Evaluation    Patient: Willie Babin Cooks    Procedure(s) Performed: Procedure(s) (LRB):  KERATOPLASTY, PENETRATING (Left)    Final Anesthesia Type: MAC  Patient location during evaluation: [facility]  Patient participation: Yes- Able to Participate  Level of consciousness: awake and alert and oriented  Post-procedure vital signs: reviewed and stable  Pain management: adequate  Airway patency: patent  PONV status at discharge: No PONV  Anesthetic complications: no      Cardiovascular status: stable  Respiratory status: unassisted, spontaneous ventilation and room air  Hydration status: euvolemic  Follow-up not needed.          Vitals Value Taken Time   /86 6/20/2019 11:19 AM   Temp 37.5 °C (99.5 °F) 6/20/2019 11:19 AM   Pulse 96 6/20/2019 11:19 AM   Resp 18 6/20/2019 11:19 AM   SpO2 96 % 6/20/2019 11:19 AM         No case tracking events are documented in the log.      Pain/Nayeli Score: No data recorded

## 2019-06-20 NOTE — PLAN OF CARE
Willie Babin Cooks has met all discharge criteria from Phase II. Vital Signs are stable, ambulating  without difficulty. Discharge instructions given, patient verbalized understanding. Discharged from facility via wheelchair in stable condition.

## 2019-06-20 NOTE — PROGRESS NOTES
Dr. Pardo notified of was notifed of grape to brown color thin fluid vomitted. Dr. Tinoco'd pt for discharge.

## 2019-06-20 NOTE — PROGRESS NOTES
Pt c/o of nausea, sat up and vomitted approximatly 75ml.grape in color fluid. Dr. Sullivan notified, ordered Zofran 4mg IV,

## 2019-06-20 NOTE — TELEPHONE ENCOUNTER
I called to check ETA for surgery today.  Patient is lost.  Directed them to the [facility] at [address] [place] [address].

## 2019-06-20 NOTE — ANESTHESIA PREPROCEDURE EVALUATION
06/20/2019  Willie Babin Cooks is a 69 y.o., female.    Anesthesia Evaluation    I have reviewed the Patient Summary Reports.    I have reviewed the Nursing Notes.   I have reviewed the Medications.     Review of Systems  Anesthesia Hx:  No problems with previous Anesthesia  Denies Family Hx of Anesthesia complications.   Denies Personal Hx of Anesthesia complications.   Social:  Non-Smoker    Cardiovascular:   Hypertension    Endocrine:   Diabetes        Physical Exam  General:  Well nourished    Airway/Jaw/Neck:  Airway Findings: Mouth Opening: Normal Mallampati: II      Dental:  Dental Findings: In tact, Edentulous        Mental Status:  Mental Status Findings:  Cooperative, Alert and Oriented         Anesthesia Plan  Type of Anesthesia, risks & benefits discussed:  Anesthesia Type:  MAC  Patient's Preference:   Intra-op Monitoring Plan: standard ASA monitors  Intra-op Monitoring Plan Comments:   Post Op Pain Control Plan:   Post Op Pain Control Plan Comments:   Induction:   IV  Beta Blocker:         Informed Consent: Patient understands risks and agrees with Anesthesia plan.  Questions answered. Anesthesia consent signed with patient.  ASA Score: 3     Day of Surgery Review of History & Physical:    H&P update referred to the surgeon.         Ready For Surgery From Anesthesia Perspective.

## 2019-06-21 ENCOUNTER — OFFICE VISIT (OUTPATIENT)
Dept: OPHTHALMOLOGY | Facility: CLINIC | Age: 69
End: 2019-06-21
Payer: MEDICARE

## 2019-06-21 DIAGNOSIS — Z94.7 STATUS POST CORNEAL TRANSPLANT: Primary | ICD-10-CM

## 2019-06-21 PROCEDURE — 99024 POSTOP FOLLOW-UP VISIT: CPT | Mod: S$GLB,,, | Performed by: OPHTHALMOLOGY

## 2019-06-21 PROCEDURE — 99999 PR PBB SHADOW E&M-EST. PATIENT-LVL II: CPT | Mod: PBBFAC,,, | Performed by: OPHTHALMOLOGY

## 2019-06-21 PROCEDURE — 99999 PR PBB SHADOW E&M-EST. PATIENT-LVL II: ICD-10-PCS | Mod: PBBFAC,,, | Performed by: OPHTHALMOLOGY

## 2019-06-21 PROCEDURE — 99024 PR POST-OP FOLLOW-UP VISIT: ICD-10-PCS | Mod: S$GLB,,, | Performed by: OPHTHALMOLOGY

## 2019-06-21 NOTE — PROGRESS NOTES
HPI     POD1 PKP OS  No pain    Last edited by Vishnu Magaña MD on 6/21/2019 10:15 AM. (History)            Assessment /Plan     For exam results, see Encounter Report.    Status post corneal transplant      POD1 PKP: Wound stable. Graft with normal edema, folds. Post operative precautions reviewed.

## 2019-07-10 ENCOUNTER — TELEPHONE (OUTPATIENT)
Dept: OPHTHALMOLOGY | Facility: CLINIC | Age: 69
End: 2019-07-10

## 2019-07-10 NOTE — TELEPHONE ENCOUNTER
----- Message from Randa Cornell sent at 7/10/2019  8:05 AM CDT -----  Contact: Cooks,Willie Babin   Pt jeanie Ms Steen  would like to speak with  nurse to [facility] please fit in early than 07/31/2019 ,pt can be reached at [phone] please rescheduled due to the weather please thank you.

## 2019-07-17 ENCOUNTER — OFFICE VISIT (OUTPATIENT)
Dept: OPHTHALMOLOGY | Facility: CLINIC | Age: 69
End: 2019-07-17
Payer: MEDICARE

## 2019-07-17 DIAGNOSIS — Z94.7 STATUS POST CORNEAL TRANSPLANT: Primary | ICD-10-CM

## 2019-07-17 PROCEDURE — 99999 PR PBB SHADOW E&M-EST. PATIENT-LVL II: CPT | Mod: PBBFAC,,, | Performed by: OPHTHALMOLOGY

## 2019-07-17 PROCEDURE — 99024 POSTOP FOLLOW-UP VISIT: CPT | Mod: S$GLB,,, | Performed by: OPHTHALMOLOGY

## 2019-07-17 PROCEDURE — 99999 PR PBB SHADOW E&M-EST. PATIENT-LVL II: ICD-10-PCS | Mod: PBBFAC,,, | Performed by: OPHTHALMOLOGY

## 2019-07-17 PROCEDURE — 99024 PR POST-OP FOLLOW-UP VISIT: ICD-10-PCS | Mod: S$GLB,,, | Performed by: OPHTHALMOLOGY

## 2019-07-17 RX ORDER — ATORVASTATIN CALCIUM 40 MG/1
TABLET, FILM COATED ORAL
Refills: 4 | COMMUNITY
Start: 2019-06-21

## 2019-07-17 NOTE — PROGRESS NOTES
HPI     DLS 6/21/19    PKP OS    Patient states that her VA has slightly improved but still blurry. C/o   tearing, fb sens and occasional pain. No other ocular complaints.    Gtts using:  Latanoprost qhs OU  Brimonidine bid  OU  Pred qid OS  Dorzolamide/Timolol bid OU    Last edited by Erin Barros on 7/17/2019  8:59 AM. (History)            Assessment /Plan     For exam results, see Encounter Report.    Status post corneal transplant      PKP stable and clear with 1+ folds. Signs and symptoms of graft rejection reviewed.  Adv glaucoma

## 2019-09-18 ENCOUNTER — OFFICE VISIT (OUTPATIENT)
Dept: OPHTHALMOLOGY | Facility: CLINIC | Age: 69
End: 2019-09-18
Payer: MEDICARE

## 2019-09-18 DIAGNOSIS — H54.40 BLINDNESS, ONE EYE: Primary | ICD-10-CM

## 2019-09-18 DIAGNOSIS — Z94.7 STATUS POST CORNEAL TRANSPLANT: ICD-10-CM

## 2019-09-18 PROCEDURE — 99999 PR PBB SHADOW E&M-EST. PATIENT-LVL II: ICD-10-PCS | Mod: PBBFAC,,, | Performed by: OPHTHALMOLOGY

## 2019-09-18 PROCEDURE — 99024 PR POST-OP FOLLOW-UP VISIT: ICD-10-PCS | Mod: S$GLB,,, | Performed by: OPHTHALMOLOGY

## 2019-09-18 PROCEDURE — 99999 PR PBB SHADOW E&M-EST. PATIENT-LVL II: CPT | Mod: PBBFAC,,, | Performed by: OPHTHALMOLOGY

## 2019-09-18 PROCEDURE — 99024 POSTOP FOLLOW-UP VISIT: CPT | Mod: S$GLB,,, | Performed by: OPHTHALMOLOGY

## 2019-09-18 RX ORDER — ROSUVASTATIN CALCIUM 5 MG/1
5 TABLET, COATED ORAL NIGHTLY
Refills: 3 | COMMUNITY
Start: 2019-08-29

## 2019-09-18 RX ORDER — PIOGLITAZONEHYDROCHLORIDE 15 MG/1
15 TABLET ORAL EVERY MORNING
Refills: 5 | COMMUNITY
Start: 2019-08-19

## 2019-09-18 RX ORDER — MECLIZINE HYDROCHLORIDE 25 MG/1
25 TABLET ORAL 3 TIMES DAILY PRN
Refills: 5 | COMMUNITY
Start: 2019-08-20

## 2019-09-18 NOTE — PROGRESS NOTES
HPI     68 y/o female presents today for a follow-up PKP OS. She states she is   doing well, she does notice some improvement in that eye as well but it is   still blurred.     Latanoprost QHS OU   Brimonidine BID  OU   Pred QID OS - Dr. Gonzalez recommended to cut down on this but waited for   opinion by Dr. Magaña  Dorzolamide/Timolol BID OU     Last edited by Malia De La Garza, PCT on 9/18/2019  9:29 AM. (History)            Assessment /Plan     For exam results, see Encounter Report.    Blindness, one eye - Right Eye    Status post corneal transplant      PKP stable and clear. Signs and symptoms of graft rejection reviewed.  6/2018    Ischemic retina and pale ON, so vision as good as can be expected.  Continue current treatment/medications

## 2019-12-17 ENCOUNTER — TELEPHONE (OUTPATIENT)
Dept: OPHTHALMOLOGY | Facility: CLINIC | Age: 69
End: 2019-12-17

## 2019-12-17 NOTE — TELEPHONE ENCOUNTER
----- Message from Leslee Aaron sent at 12/17/2019  8:49 AM CST -----  Contact: Jonathans (daughter)   Pt daughter wanted to know if pt could be this week or next week for her 3 month f/u. Pt daughter stated her father is getting out the hospital and she needed to go pick him up so she cannot bring her mom to her appt. Pt daughter contact number is [phone].

## 2020-01-15 ENCOUNTER — OFFICE VISIT (OUTPATIENT)
Dept: OPHTHALMOLOGY | Facility: CLINIC | Age: 70
End: 2020-01-15
Payer: MEDICARE

## 2020-01-15 DIAGNOSIS — Z94.7 STATUS POST CORNEAL TRANSPLANT: ICD-10-CM

## 2020-01-15 DIAGNOSIS — H54.40 BLINDNESS, ONE EYE: Primary | ICD-10-CM

## 2020-01-15 DIAGNOSIS — T86.8419 CORNEAL TRANSPLANT FAILURE: ICD-10-CM

## 2020-01-15 PROCEDURE — 99999 PR PBB SHADOW E&M-EST. PATIENT-LVL II: ICD-10-PCS | Mod: PBBFAC,,, | Performed by: OPHTHALMOLOGY

## 2020-01-15 PROCEDURE — 99999 PR PBB SHADOW E&M-EST. PATIENT-LVL II: CPT | Mod: PBBFAC,,, | Performed by: OPHTHALMOLOGY

## 2020-01-15 PROCEDURE — 92014 PR EYE EXAM, EST PATIENT,COMPREHESV: ICD-10-PCS | Mod: S$GLB,,, | Performed by: OPHTHALMOLOGY

## 2020-01-15 PROCEDURE — 92014 COMPRE OPH EXAM EST PT 1/>: CPT | Mod: S$GLB,,, | Performed by: OPHTHALMOLOGY

## 2020-01-15 NOTE — PROGRESS NOTES
HPI     68 y/o female presents today for a follow-up PKP OS. She states she is   doing well, she does notice some improvement in that eye as well but it is   still blurred.     Latanoprost QHS OU   Brimonidine BID  OU   Pred BID OS - Dr. Gonzalez recommended to cut down on this.  Dorzolamide/Timolol BID OU     Last edited by Malia De La Garza, PCT on 1/15/2020 10:43 AM. (History)            Assessment /Plan     For exam results, see Encounter Report.    Blindness, one eye - Right Eye    Status post corneal transplant    Corneal transplant failure      PKP stable and sl thick. Signs and symptoms of graft rejection reviewed.  6/2018    Ischemic retina and pale ON, so vision as good as can be expected.  Continue current treatment/medications

## 2020-05-20 ENCOUNTER — TELEPHONE (OUTPATIENT)
Dept: OPHTHALMOLOGY | Facility: CLINIC | Age: 70
End: 2020-05-20

## 2020-05-20 NOTE — TELEPHONE ENCOUNTER
----- Message from Leslee Aaron sent at 5/20/2020  1:44 PM CDT -----  Contact: Sharon Herrera is calling to get pt last office notes. Sharon fax number is [phone] or [phone].

## 2020-05-27 ENCOUNTER — TELEPHONE (OUTPATIENT)
Dept: OPHTHALMOLOGY | Facility: CLINIC | Age: 70
End: 2020-05-27

## 2020-05-27 NOTE — TELEPHONE ENCOUNTER
Called both numbers and left a message to call back to schedule appointment.   ----- Message from Nakul Read sent at 5/27/2020 11:15 AM CDT -----  Contact: Celsa Levy (jeanie)  Celsa (jeanie) is having problems with vision and need to make an appt as soon as possible.    Celsa# [phone] or [phone]

## 2020-05-28 ENCOUNTER — TELEPHONE (OUTPATIENT)
Dept: OPHTHALMOLOGY | Facility: CLINIC | Age: 70
End: 2020-05-28

## 2020-05-28 RX ORDER — PREDNISOLONE ACETATE 10 MG/ML
1 SUSPENSION/ DROPS OPHTHALMIC 2 TIMES DAILY
Qty: 10 ML | Refills: 1 | Status: SHIPPED | OUTPATIENT
Start: 2020-05-28 | End: 2020-08-06

## 2020-05-28 NOTE — TELEPHONE ENCOUNTER
----- Message from Camila Herr sent at 5/28/2020 11:26 AM CDT -----  Contact: pt daughter/Celsa  Please call pt daughter at [phone]    Patient daughter is requesting an appt for next week due to transportation arrangements    Cancelled today's appt    Thank you

## 2020-05-28 NOTE — TELEPHONE ENCOUNTER
----- Message from Jolene Duckworth sent at 5/28/2020 10:38 AM CDT -----  Contact: Celsa  Pt daughter needs a sho back in regards to making an emergency appt for her mother. She also states her mother is out of the prednisoLONE. Celsa can be reached at  [phone]

## 2020-05-28 NOTE — TELEPHONE ENCOUNTER
Appointment rescheduled at daughters request to 06/02 11 [facility] LOCATION.

## 2020-05-28 NOTE — TELEPHONE ENCOUNTER
Mother complaining of acute vision loss.  Appointment scheduled for tomorrow at their request.  Rx sent in for Prednisolone to [facility].

## 2020-06-02 ENCOUNTER — OFFICE VISIT (OUTPATIENT)
Dept: OPHTHALMOLOGY | Facility: CLINIC | Age: 70
End: 2020-06-02
Attending: OPHTHALMOLOGY
Payer: MEDICARE

## 2020-06-02 DIAGNOSIS — T86.8419 CORNEAL TRANSPLANT FAILURE: Primary | ICD-10-CM

## 2020-06-02 DIAGNOSIS — H54.40 BLINDNESS, ONE EYE: ICD-10-CM

## 2020-06-02 DIAGNOSIS — H18.20 CORNEAL EDEMA: ICD-10-CM

## 2020-06-02 PROCEDURE — 99999 PR PBB SHADOW E&M-EST. PATIENT-LVL II: ICD-10-PCS | Mod: PBBFAC,,, | Performed by: OPHTHALMOLOGY

## 2020-06-02 PROCEDURE — 99999 PR PBB SHADOW E&M-EST. PATIENT-LVL II: CPT | Mod: PBBFAC,,, | Performed by: OPHTHALMOLOGY

## 2020-06-02 PROCEDURE — 92014 PR EYE EXAM, EST PATIENT,COMPREHESV: ICD-10-PCS | Mod: S$GLB,,, | Performed by: OPHTHALMOLOGY

## 2020-06-02 PROCEDURE — 92014 COMPRE OPH EXAM EST PT 1/>: CPT | Mod: S$GLB,,, | Performed by: OPHTHALMOLOGY

## 2020-06-02 RX ORDER — MOXIFLOXACIN 5 MG/ML
1 SOLUTION/ DROPS OPHTHALMIC
Status: CANCELLED | OUTPATIENT
Start: 2020-06-02

## 2020-06-02 RX ORDER — TETRACAINE HYDROCHLORIDE 5 MG/ML
1 SOLUTION OPHTHALMIC
Status: CANCELLED | OUTPATIENT
Start: 2020-06-02

## 2020-06-02 RX ORDER — DUREZOL 0.5 MG/ML
EMULSION OPHTHALMIC
COMMUNITY
Start: 2020-05-20 | End: 2020-09-22

## 2020-06-02 NOTE — PROGRESS NOTES
HPI     Eye Problem      Additional comments: Decreased vision.               Comments     70 YO female presents today for a follow-up PKP OS. She states she has   noticed a decrease in vision OS over the last couple of months she was   seeing Dr. Mireles due to not being able to travel into [place]. She   notes that nothing has changed since the visit with him. He did add   Durezol with no improvement. Notes that she sees a lot of colors.     Durezol OS BID - should she continue taking?  Latanoprost QHS OU   Brimonidine BID  OU   Pred BID OS - Dr. Menchaca stopped.   Dorzolamide/Timolol BID OU           Last edited by Malia De La Garza, PCT on 6/2/2020 11:06 AM. (History)            Assessment /Plan     For exam results, see Encounter Report.    Corneal transplant failure    Blindness, one eye - Right Eye    Corneal edema    Glaucoma shunt device of left eye      Failed PKP OS from 1 year ago 6/2019 (hx adv glaucoma Tubex x2 and previous failed DSEK 6/2017)    Limited baseline vision, but had functional improvement for a year, so may consider PKP #2  (would use young tissue and oversize by 0.5/0,.75 to vault tubes and AC fibrosis)

## 2020-06-15 DIAGNOSIS — T86.8419 FAILED CORNEAL TRANSPLANT: Primary | ICD-10-CM

## 2020-06-23 ENCOUNTER — TELEPHONE (OUTPATIENT)
Dept: OPHTHALMOLOGY | Facility: CLINIC | Age: 70
End: 2020-06-23

## 2020-06-23 RX ORDER — OFLOXACIN 3 MG/ML
1 SOLUTION/ DROPS OPHTHALMIC 4 TIMES DAILY
Qty: 5 ML | Refills: 0 | Status: SHIPPED | OUTPATIENT
Start: 2020-06-23 | End: 2020-07-03

## 2020-06-23 NOTE — TELEPHONE ENCOUNTER
----- Message from Jesse Preston sent at 6/23/2020 11:35 AM CDT -----  Regarding: reschdule cataract surgery  This patient daughter called wanting to reschedule her mothers cataract surgery that she has scheduled on June 25th due transportation     PT daughter: [phone]

## 2020-06-23 NOTE — TELEPHONE ENCOUNTER
Patient has not yet had COVID test done, unsure they will be able to get it done.  Will call me back

## 2020-07-08 ENCOUNTER — TELEPHONE (OUTPATIENT)
Dept: OPHTHALMOLOGY | Facility: CLINIC | Age: 70
End: 2020-07-08

## 2020-07-08 NOTE — TELEPHONE ENCOUNTER
Spoke with patients brother Patrick Scott & sister. Told them her surgery arrival time, which is 10am, on 7/13/2020. Nothing to eat or drink after 9pm, the night before surgery. May have water, gatorade, or powerade after 9pm, until leaving home the morning of surgery. Start drops on Saturday, one drop TID into operative eye. 7/8/20 TR

## 2020-07-11 ENCOUNTER — LAB VISIT (OUTPATIENT)
Dept: URGENT CARE | Facility: CLINIC | Age: 70
End: 2020-07-11
Payer: MEDICARE

## 2020-07-11 DIAGNOSIS — T86.8419 CORNEAL TRANSPLANT FAILURE: ICD-10-CM

## 2020-07-11 PROCEDURE — U0003 INFECTIOUS AGENT DETECTION BY NUCLEIC ACID (DNA OR RNA); SEVERE ACUTE RESPIRATORY SYNDROME CORONAVIRUS 2 (SARS-COV-2) (CORONAVIRUS DISEASE [COVID-19]), AMPLIFIED PROBE TECHNIQUE, MAKING USE OF HIGH THROUGHPUT TECHNOLOGIES AS DESCRIBED BY CMS-2020-01-R: HCPCS

## 2020-07-11 NOTE — PROGRESS NOTES
Subjective:       Patient ID: Willie Babin Cooks is a 70 y.o. female.    Vitals:  vitals were not taken for this visit.     Chief Complaint: Labs Only    Pt being tested for pre-procedure Covid 19.     Other  Pertinent negatives include no arthralgias, chest pain, chills, congestion, coughing, fatigue, fever, headaches, joint swelling, myalgias, nausea, rash, sore throat, vertigo, vomiting or weakness.       Constitution: Negative for chills, fatigue and fever.   HENT: Negative for congestion and sore throat.    Neck: Negative for painful lymph nodes.   Cardiovascular: Negative for chest pain and leg swelling.   Eyes: Negative for double vision and blurred vision.   Respiratory: Negative for cough and shortness of breath.    Gastrointestinal: Negative for nausea, vomiting and diarrhea.   Genitourinary: Negative for dysuria, frequency, urgency and history of kidney stones.   Musculoskeletal: Negative for joint pain, joint swelling, muscle cramps and muscle ache.   Skin: Negative for color change, pale, rash and bruising.   Allergic/Immunologic: Negative for seasonal allergies.   Neurological: Negative for dizziness, history of vertigo, light-headedness, passing out and headaches.   Hematologic/Lymphatic: Negative for swollen lymph nodes.   Psychiatric/Behavioral: Negative for nervous/anxious, sleep disturbance and depression. The patient is not nervous/anxious.        Objective:      Physical Exam      Assessment:       1. Corneal transplant failure        Plan:         Corneal transplant failure  -     COVID-19 Routine Screening

## 2020-07-12 LAB — SARS-COV-2 RNA RESP QL NAA+PROBE: NOT DETECTED

## 2020-07-13 ENCOUNTER — ANESTHESIA EVENT (OUTPATIENT)
Dept: SURGERY | Facility: OTHER | Age: 70
End: 2020-07-13
Payer: MEDICARE

## 2020-07-13 ENCOUNTER — HOSPITAL ENCOUNTER (OUTPATIENT)
Facility: OTHER | Age: 70
Discharge: HOME | End: 2020-07-13
Attending: OPHTHALMOLOGY | Admitting: OPHTHALMOLOGY
Payer: MEDICARE

## 2020-07-13 ENCOUNTER — ANESTHESIA (OUTPATIENT)
Dept: SURGERY | Facility: OTHER | Age: 70
End: 2020-07-13
Payer: MEDICARE

## 2020-07-13 VITALS
DIASTOLIC BLOOD PRESSURE: 71 MMHG | TEMPERATURE: 99 F | SYSTOLIC BLOOD PRESSURE: 115 MMHG | HEIGHT: 67 IN | HEART RATE: 96 BPM | OXYGEN SATURATION: 97 % | WEIGHT: 200 LBS | RESPIRATION RATE: 16 BRPM | BODY MASS INDEX: 31.39 KG/M2

## 2020-07-13 DIAGNOSIS — H05.239 RETROBULBAR HEMORRHAGE: ICD-10-CM

## 2020-07-13 DIAGNOSIS — T86.8419 CORNEAL TRANSPLANT FAILURE: ICD-10-CM

## 2020-07-13 DIAGNOSIS — T86.8419 FAILED CORNEAL TRANSPLANT: Primary | ICD-10-CM

## 2020-07-13 LAB — POCT GLUCOSE: 173 MG/DL (ref 70–110)

## 2020-07-13 PROCEDURE — 36000707: Performed by: OPHTHALMOLOGY

## 2020-07-13 PROCEDURE — 63600175 PHARM REV CODE 636 W HCPCS: Performed by: OPHTHALMOLOGY

## 2020-07-13 PROCEDURE — 99499 NO LOS: ICD-10-PCS | Mod: ,,, | Performed by: OPHTHALMOLOGY

## 2020-07-13 PROCEDURE — 63600175 PHARM REV CODE 636 W HCPCS: Performed by: NURSE ANESTHETIST, CERTIFIED REGISTERED

## 2020-07-13 PROCEDURE — 71000015 HC POSTOP RECOV 1ST HR: Performed by: OPHTHALMOLOGY

## 2020-07-13 PROCEDURE — 37000008 HC ANESTHESIA 1ST 15 MINUTES: Performed by: OPHTHALMOLOGY

## 2020-07-13 PROCEDURE — 37000009 HC ANESTHESIA EA ADD 15 MINS: Performed by: OPHTHALMOLOGY

## 2020-07-13 PROCEDURE — 99499 UNLISTED E&M SERVICE: CPT | Mod: ,,, | Performed by: OPHTHALMOLOGY

## 2020-07-13 PROCEDURE — 36000706: Performed by: OPHTHALMOLOGY

## 2020-07-13 PROCEDURE — S0020 INJECTION, BUPIVICAINE HYDRO: HCPCS | Performed by: OPHTHALMOLOGY

## 2020-07-13 PROCEDURE — 25000003 PHARM REV CODE 250: Performed by: OPHTHALMOLOGY

## 2020-07-13 RX ORDER — CEFAZOLIN SODIUM 1 G/3ML
INJECTION, POWDER, FOR SOLUTION INTRAMUSCULAR; INTRAVENOUS
Status: DISCONTINUED | OUTPATIENT
Start: 2020-07-13 | End: 2020-07-13 | Stop reason: HOSPADM

## 2020-07-13 RX ORDER — MONTELUKAST SODIUM 10 MG/1
10 TABLET ORAL DAILY
COMMUNITY

## 2020-07-13 RX ORDER — FENTANYL CITRATE 50 UG/ML
INJECTION, SOLUTION INTRAMUSCULAR; INTRAVENOUS
Status: DISCONTINUED | OUTPATIENT
Start: 2020-07-13 | End: 2020-07-13

## 2020-07-13 RX ORDER — DEXAMETHASONE SODIUM PHOSPHATE 4 MG/ML
INJECTION, SOLUTION INTRA-ARTICULAR; INTRALESIONAL; INTRAMUSCULAR; INTRAVENOUS; SOFT TISSUE
Status: DISCONTINUED | OUTPATIENT
Start: 2020-07-13 | End: 2020-07-13 | Stop reason: HOSPADM

## 2020-07-13 RX ORDER — MOXIFLOXACIN 5 MG/ML
1 SOLUTION/ DROPS OPHTHALMIC
Status: COMPLETED | OUTPATIENT
Start: 2020-07-13 | End: 2020-07-13

## 2020-07-13 RX ORDER — LIDOCAINE HYDROCHLORIDE 20 MG/ML
INJECTION, SOLUTION INFILTRATION; PERINEURAL
Status: DISCONTINUED | OUTPATIENT
Start: 2020-07-13 | End: 2020-07-13 | Stop reason: HOSPADM

## 2020-07-13 RX ORDER — MIDAZOLAM HYDROCHLORIDE 1 MG/ML
INJECTION INTRAMUSCULAR; INTRAVENOUS
Status: DISCONTINUED | OUTPATIENT
Start: 2020-07-13 | End: 2020-07-13

## 2020-07-13 RX ORDER — TETRACAINE HYDROCHLORIDE 5 MG/ML
1 SOLUTION OPHTHALMIC
Status: COMPLETED | OUTPATIENT
Start: 2020-07-13 | End: 2020-07-13

## 2020-07-13 RX ORDER — BUPIVACAINE HYDROCHLORIDE 7.5 MG/ML
INJECTION, SOLUTION EPIDURAL; RETROBULBAR
Status: DISCONTINUED | OUTPATIENT
Start: 2020-07-13 | End: 2020-07-13 | Stop reason: HOSPADM

## 2020-07-13 RX ADMIN — TETRACAINE HYDROCHLORIDE 1 DROP: 5 SOLUTION OPHTHALMIC at 10:07

## 2020-07-13 RX ADMIN — MIDAZOLAM HYDROCHLORIDE 1 MG: 1 INJECTION, SOLUTION INTRAMUSCULAR; INTRAVENOUS at 11:07

## 2020-07-13 RX ADMIN — MOXIFLOXACIN 1 DROP: 5 SOLUTION/ DROPS OPHTHALMIC at 10:07

## 2020-07-13 RX ADMIN — FENTANYL CITRATE 50 MCG: 50 INJECTION, SOLUTION INTRAMUSCULAR; INTRAVENOUS at 11:07

## 2020-07-13 NOTE — DISCHARGE SUMMARY
Outcome: Cancelled outpatient ophthalmic surgical procedure  Preprinted Instructions given to patient.  Regular diet.  Activity: No restrictions  Meds: see Med Rec  Condition: stable  Follow up: 1 day with Dr Magaña  Disposition: Home  Diagnosis: s/p eye surgery

## 2020-07-13 NOTE — ANESTHESIA PREPROCEDURE EVALUATION
07/13/2020  Willie Babin Cooks is a 70 y.o., female.    Anesthesia Evaluation    I have reviewed the Patient Summary Reports.    I have reviewed the Nursing Notes. I have reviewed the NPO Status.   I have reviewed the Medications.     Review of Systems  Anesthesia Hx:  No problems with previous Anesthesia    Social:  Non-Smoker, No Alcohol Use    Hematology/Oncology:  Hematology Normal   Oncology Normal     EENT/Dental:   Glaucoma. Only perceives light. [place].   Cardiovascular:   Exercise tolerance: poor Hypertension    Pulmonary:  Pulmonary Normal    Renal/:  Renal/ Normal     Hepatic/GI:  Hepatic/GI Normal    Musculoskeletal:  Musculoskeletal Normal    Endocrine:   Diabetes, well controlled, type 2    Dermatological:  Skin Normal    Psych:  Psychiatric Normal           Physical Exam  General:  Obesity    Airway/Jaw/Neck:  Airway Findings: Mouth Opening: Normal Tongue: Normal  General Airway Assessment: Adult, Average  Mallampati: II  TM Distance: Normal, at least 6 cm  Jaw/Neck Findings:  Neck ROM: Normal ROM      Dental:  Dental Findings: Edentulous        Mental Status:  Mental Status Findings:  Cooperative, Alert and Oriented         Anesthesia Plan  Type of Anesthesia, risks & benefits discussed:  Anesthesia Type:  MAC  Patient's Preference:   Intra-op Monitoring Plan: standard ASA monitors  Intra-op Monitoring Plan Comments:   Post Op Pain Control Plan: per primary service following discharge from PACU  Post Op Pain Control Plan Comments:   Induction:    Beta Blocker:         Informed Consent: Patient representative understands risks and agrees with Anesthesia plan.  Questions answered. Anesthesia consent signed with patient representative.  ASA Score: 3     Day of Surgery Review of History & Physical:    H&P update referred to the surgeon.         Ready For Surgery From Anesthesia Perspective.

## 2020-07-13 NOTE — DISCHARGE INSTRUCTIONS
Anesthesia: Monitored Anesthesia Care (MAC)  Anesthesia safety  Tips for anesthesia safety include the following:   · Follow all instructions you are given for how long not to eat or drink before your procedure.  · Be sure your healthcare provider knows what medicines you take, especially any anti-inflammatory medicine or blood thinners. This includes aspirin and any other over-the-counter medicines, herbs, and supplements.  · Have an adult family member or friend drive you home after the procedure.  · For the first 24 hours after your surgery:  ¨ Do not drive or use heavy equipment.  ¨ Do not make important decisions or sign documents.  ¨ Avoid alcohol.  ¨ Have someone stay with you, if possible. They can watch for problems and help keep you safe.  Date Last Reviewed: 12/1/2016 © 2000-2017 [employer]. [address] [place] [address], [place] [address]. All rights reserved. This information is not intended as a substitute for professional medical care. Always follow your healthcare professional's instructions.

## 2020-07-13 NOTE — OR NURSING
Patient returned to outpatient surgery, procedure was cancelled due to hemorraghing behind the eye. Dr. Magaña gave patient and family instructions to take the eye patch off in the morning and continue to use eye drops as she was prior to today.

## 2020-07-13 NOTE — ANESTHESIA POSTPROCEDURE EVALUATION
Anesthesia Post Evaluation    Patient: Willie Babin Cooks    Procedure(s) Performed: Procedure(s) (LRB):  TRANSPLANT, CORNEA (Left)    Final Anesthesia Type: MAC    Patient location during evaluation: [facility]  Patient participation: Yes- Able to Participate  Level of consciousness: awake and alert  Post-procedure vital signs: reviewed and stable  Pain management: adequate  Airway patency: patent    PONV status at discharge: No PONV  Anesthetic complications: no      Cardiovascular status: blood pressure returned to baseline  Respiratory status: unassisted and spontaneous ventilation  Hydration status: euvolemic  Follow-up not needed.          Vitals Value Taken Time   /83 07/13/20 1051   Temp 37.3 °C (99.2 °F) 07/13/20 1051   Pulse 118 07/13/20 1051   Resp 16 07/13/20 1051   SpO2 98 % 07/13/20 1051         No case tracking events are documented in the log.      Pain/Nayeli Score: No data recorded

## 2020-07-13 NOTE — OP NOTE
SURGEON:  Vishnu Magaña M.D.    PREOPERATIVE DIAGNOSIS:    1) Failed PKP OS  2)    POSTOPERATIVE DIAGNOSIS:     1) Failed PKP OS  2) Retrobulbar hemorrhage    PROCEDURE:    1. NONE: Procedure cancelled after retrobulbar block  2.     ANESTHESIA:  RBB      DATE OF SURGERY:  07/13/2020      COMPLICATIONS:  Retrobulbar hemorrhage OS.    BLOOD LOSS: Less than 5 cc    SPECIMENS:   1) Cornea     INDICATIONS FOR PROCEDURE :       After a thorough discussion of risks, benefits and alternatives, including, but not limited to, infection, severe hemorrhage, graft failure, need for repeat transplantation, loss of vision, and loss of the eye,  the patient voices understanding and desires to proceed with corneal transplantation.    PROCEDURE IN DETAIL:    The patient was brought to the operating room in supine position where a retrobublar block was done, but significant orbital pressure was noted. A mild retrobublar hemorrhage was noted, without dangerously elevated eye pressure, but enough posterior pressure of the globe to make me feel uncomfortable with a open vaughn procedure in this monocular patient. Therefore, the procedure was cancelled.

## 2020-07-26 ENCOUNTER — NURSE TRIAGE (OUTPATIENT)
Dept: ADMINISTRATIVE | Facility: CLINIC | Age: 70
End: 2020-07-26

## 2020-07-26 NOTE — TELEPHONE ENCOUNTER
Pt did not have surgery on 7/13/20, and pt reports no symptoms.    Reason for Disposition   [1] Follow-up call to recent contact AND [2] information only call, no triage required    Protocols used: INFORMATION ONLY CALL - NO TRIAGE-A-

## 2020-08-11 ENCOUNTER — TELEPHONE (OUTPATIENT)
Dept: OPHTHALMOLOGY | Facility: CLINIC | Age: 70
End: 2020-08-11

## 2020-08-11 NOTE — TELEPHONE ENCOUNTER
----- Message from Jennifer Brasher sent at 8/11/2020  9:17 AM CDT -----  Contact: Edwin guevara  [facility] is calling to schedule an appt for pt       Pt contact [phone]

## 2020-09-08 ENCOUNTER — TELEPHONE (OUTPATIENT)
Dept: OPHTHALMOLOGY | Facility: CLINIC | Age: 70
End: 2020-09-08

## 2020-09-08 NOTE — TELEPHONE ENCOUNTER
----- Message from Charity Linda sent at 9/7/2020 11:57 AM CDT -----  Contact: Celsa, daughter, [phone]  Patient requests to reschedule 9/8 appointment due to no transportation, requests to reschedule to end of month on a Wednesday if possible. Please call.

## 2020-09-22 ENCOUNTER — TELEPHONE (OUTPATIENT)
Dept: OPHTHALMOLOGY | Facility: CLINIC | Age: 70
End: 2020-09-22

## 2020-09-22 NOTE — TELEPHONE ENCOUNTER
----- Message from Leslee Aaron sent at 9/22/2020  8:58 AM CDT -----  Contact: Ms. Rocha  Patient daughter is calling to get a refill of patient eye drops (Prednisolone) called in for her. Patient daughter stated patient have been out of her drops and she have been calling the pharmacy for two days now. Patient pharmacy stated they have been waiting on a fax to fill patient drops. Patient daughter wanted the office to call her when the office have fax over refill request [phone]. Patient daughter stated they needed refill called into [facility].

## 2020-09-25 ENCOUNTER — OFFICE VISIT (OUTPATIENT)
Dept: OPHTHALMOLOGY | Facility: CLINIC | Age: 70
End: 2020-09-25
Attending: OPHTHALMOLOGY
Payer: MEDICARE

## 2020-09-25 DIAGNOSIS — H54.40 BLINDNESS, ONE EYE: ICD-10-CM

## 2020-09-25 DIAGNOSIS — T86.8419 FAILED CORNEAL TRANSPLANT: Primary | ICD-10-CM

## 2020-09-25 PROCEDURE — 92014 COMPRE OPH EXAM EST PT 1/>: CPT | Mod: S$GLB,,, | Performed by: OPHTHALMOLOGY

## 2020-09-25 PROCEDURE — 99999 PR PBB SHADOW E&M-EST. PATIENT-LVL III: ICD-10-PCS | Mod: PBBFAC,,, | Performed by: OPHTHALMOLOGY

## 2020-09-25 PROCEDURE — 99999 PR PBB SHADOW E&M-EST. PATIENT-LVL III: CPT | Mod: PBBFAC,,, | Performed by: OPHTHALMOLOGY

## 2020-09-25 PROCEDURE — 92014 PR EYE EXAM, EST PATIENT,COMPREHESV: ICD-10-PCS | Mod: S$GLB,,, | Performed by: OPHTHALMOLOGY

## 2020-09-25 RX ORDER — BLOOD-GLUCOSE METER
EACH MISCELLANEOUS 2 TIMES DAILY
COMMUNITY
Start: 2020-06-23

## 2020-09-25 RX ORDER — FLUTICASONE PROPIONATE 50 MCG
SPRAY, SUSPENSION (ML) NASAL
COMMUNITY
Start: 2020-08-04

## 2020-09-25 RX ORDER — DOXYCYCLINE 100 MG/1
CAPSULE ORAL
COMMUNITY
Start: 2020-08-16

## 2020-09-25 NOTE — PROGRESS NOTES
HPI     Willie Cooks is a/an 70 y.o. female here for 3 mo f/u. Patient c/o blurry   vision and denies any other complaints.    dorzolamide-timolol (COSOPT) 2-0.5 % ophthalmic solution  brimonidine (ALPHAGAN) 0.2 % ophthalmic solution  timolol maleate 0.5% (TIMOPTIC) 0.5 % Drop  sodium chloride (SUJATA 128) 5 % ophthalmic solution  prednisoLONE acetate (PRED FORTE) 1 % DrpS  ketorolac 0.5% (ACULAR) 0.5 % Drop  nepafenac (ILEVRO) 0.3 % DrpS  latanoprost (XALATAN) 0.005 % ophthalmic solution    Patient has h/o:  - s/p Penetrating keratoplasty (Left), 06/20/2019  - s/p Corneal transplant (Left), 07/06/2017  - s/p Glaucoma surgery, 11/2012  - s/p Corneal transplant, 07/21/2011  - s/p Cataract extraction  - blindness, one eye OD  - bullous keratopathy OS  - glaucoma OU  - corneal transplant failure    Last edited by Greg Payan on 9/25/2020  9:35 AM. (History)            Assessment /Plan     For exam results, see Encounter Report.    Failed corneal transplant    Blindness, one eye - Right Eye      Failed PKP OS from 1 year ago 6/2019 (hx adv glaucoma Tubex x2 and previous failed DSEK 6/2017)    Limited baseline vision, but had functional improvement for a year, so may consider PKP #2  (would use young tissue and oversize by 0.5/0,.75 to vault tubes and AC fibrosis)    Had Retrobulbar Heme on the table at last attempted PKP, so case cancelled.

## 2020-10-19 ENCOUNTER — TELEPHONE (OUTPATIENT)
Dept: OPHTHALMOLOGY | Facility: CLINIC | Age: 70
End: 2020-10-19

## 2020-10-19 NOTE — TELEPHONE ENCOUNTER
Spoke to Celsa it is a common issue to see colors and things that are not there with her condition. I will speak to Dr. Magaña to see about low vision clinic or if PKP would be beneficial.   none

## 2020-10-19 NOTE — TELEPHONE ENCOUNTER
----- Message from Jolene Duckworth sent at 10/19/2020  9:32 AM CDT -----  Contact: Celsa @ [phone] or [phone]  Pt daughter calling regarding her mother has been seeing a rainbow of colors in her vision. She would like to know what could be done and if she needs to be seen. Pt daughter states she is heading towards depression because of it.

## 2020-10-20 ENCOUNTER — TELEPHONE (OUTPATIENT)
Dept: OPHTHALMOLOGY | Facility: CLINIC | Age: 70
End: 2020-10-20

## 2020-10-20 NOTE — TELEPHONE ENCOUNTER
----- Message from April TESSIE Hitchcock MA sent at 10/20/2020  2:18 PM CDT -----  Dr.Lawton Keily have a brochure. I just emailed it to you.  April  ----- Message -----  From: AILIN Izaguirre  Sent: 10/20/2020   8:46 AM CDT  To: Kadie [facility]enport Staff, #    Hello,   This patient is experiencing Lenard Bonnet syndrome, and Dr. Magaña is kind of at a loss as to what we can tell her or do for her. We would appreciate any recommendations either doctor may have. Can you please assist?

## 2020-10-20 NOTE — TELEPHONE ENCOUNTER
Spoke to daughter, emailed her brochure from Dr. Engle also informed her that Dr. Pfeiffer recommended [facility]. I emailed all the information to Celsa and sent referral to [facility].

## 2020-10-20 NOTE — TELEPHONE ENCOUNTER
----- Message from AILIN Izaguirre sent at 10/19/2020  2:15 PM CDT -----  Ask Dr. Magaña about low vision clinic or PKP?

## 2020-11-13 ENCOUNTER — TELEPHONE (OUTPATIENT)
Dept: OPHTHALMOLOGY | Facility: CLINIC | Age: 70
End: 2020-11-13

## 2020-11-13 NOTE — TELEPHONE ENCOUNTER
"----- Message from Mandeep Palmer sent at 11/13/2020 10:46 AM CST -----  Consult/Advisory:    Name Of Caller: Celsa Wong (daughter)  Contact Preference?: [phone]    Does patient feel the need to be seen today? No     What is the nature of the call?:  -request a callback regarding guidance or getting assistance/help with Lenard Alvarez Syndrome     Additional Notes:  "Thank you for all that you do for our patients'"    "

## 2020-11-13 NOTE — TELEPHONE ENCOUNTER
I spoke to patient's daughter, Mrs Wong, family is very frustrated.  Feels they are not getting any support. Has seen primary care provider Was prescribed xanax but feels it isn't helping.  Advised family she likely needs to see a therapist but they don't feel that will help. Also referred patient to the [facility] but family feels that is just to help patient get around which they do for the patient.  Will have Dr Magaña reach out to family.

## 2020-12-11 ENCOUNTER — TELEPHONE (OUTPATIENT)
Dept: OPTOMETRY | Facility: CLINIC | Age: 70
End: 2020-12-11

## 2020-12-11 DIAGNOSIS — H54.10 BLINDNESS AND LOW VISION: ICD-10-CM

## 2020-12-11 DIAGNOSIS — H53.16 CHARLES BONNET SYNDROME: Primary | ICD-10-CM

## 2020-12-11 NOTE — TELEPHONE ENCOUNTER
----- Message from Erin Zhang sent at 12/10/2020  4:50 PM CST -----  Regarding: FW: Ms. Cooks says that she have left several messages and no one has returned her call  Contact: Brando  Read your last telephone documentation and felt I could not add anything positive by calling family tonight.  Best that [facility] calls as stated in your documentation     Ashlie  ----- Message -----  From: Garth Bryson  Sent: 12/10/2020   3:00 PM CST  To: [facility] Staff  Subject: Ms. Cooks says that she have left several me#    Ms. Cooks says that she have left several messages and no one has returned her call. She can be reached at  [phone]

## 2020-12-14 NOTE — TELEPHONE ENCOUNTER
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 Also scheduled follow-up for 12/29 at 11 am at Dr Magaña's request.

## 2020-12-21 ENCOUNTER — OUTPATIENT CASE MANAGEMENT (OUTPATIENT)
Dept: ADMINISTRATIVE | Facility: OTHER | Age: 70
End: 2020-12-21

## 2020-12-21 NOTE — PROGRESS NOTES
LMSW contacted patient caregiver/Jonathansa regarding referral. LMSW explained reason for referral is to assist with resources. LMSW explained reason for referral is to assist with resources. Daughter denies patient need more in home care as her children assist. Daughter is hoping patient may visit a therapist due to her current diagnosis. 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 CISCOSW explained to daughter she may research available therapist in her area on [employer] website. CISCOSW explained to daughter she will follow up with affiliated Blind to determine avlabe therapy resources. Daughter reports being firmilar with agency as her uncle rescives services through the [employer] [facility]. Daughter is also hoping patient will be able to obain a PCP closet to the home      Plan : research possible suport groups in patient area. Also attempt to locate a PCP in patient area.

## 2020-12-30 ENCOUNTER — OUTPATIENT CASE MANAGEMENT (OUTPATIENT)
Dept: ADMINISTRATIVE | Facility: OTHER | Age: 70
End: 2020-12-30

## 2021-01-11 ENCOUNTER — OUTPATIENT CASE MANAGEMENT (OUTPATIENT)
Dept: ADMINISTRATIVE | Facility: OTHER | Age: 71
End: 2021-01-11

## 2021-01-29 ENCOUNTER — TELEPHONE (OUTPATIENT)
Dept: OPTOMETRY | Facility: CLINIC | Age: 71
End: 2021-01-29

## 2021-01-29 ENCOUNTER — OFFICE VISIT (OUTPATIENT)
Dept: OPHTHALMOLOGY | Facility: CLINIC | Age: 71
End: 2021-01-29
Attending: OPHTHALMOLOGY
Payer: MEDICARE

## 2021-01-29 DIAGNOSIS — H53.16 CHARLES BONNET SYNDROME: ICD-10-CM

## 2021-01-29 DIAGNOSIS — H54.40 BLINDNESS, ONE EYE: Primary | ICD-10-CM

## 2021-01-29 PROCEDURE — 1101F PR PT FALLS ASSESS DOC 0-1 FALLS W/OUT INJ PAST YR: ICD-10-PCS | Mod: CPTII,S$GLB,, | Performed by: OPHTHALMOLOGY

## 2021-01-29 PROCEDURE — 3288F PR FALLS RISK ASSESSMENT DOCUMENTED: ICD-10-PCS | Mod: CPTII,S$GLB,, | Performed by: OPHTHALMOLOGY

## 2021-01-29 PROCEDURE — 92012 INTRM OPH EXAM EST PATIENT: CPT | Mod: S$GLB,,, | Performed by: OPHTHALMOLOGY

## 2021-01-29 PROCEDURE — 99999 PR PBB SHADOW E&M-EST. PATIENT-LVL IV: CPT | Mod: PBBFAC,,, | Performed by: OPHTHALMOLOGY

## 2021-01-29 PROCEDURE — 1126F PR PAIN SEVERITY QUANTIFIED, NO PAIN PRESENT: ICD-10-PCS | Mod: S$GLB,,, | Performed by: OPHTHALMOLOGY

## 2021-01-29 PROCEDURE — 99999 PR PBB SHADOW E&M-EST. PATIENT-LVL IV: ICD-10-PCS | Mod: PBBFAC,,, | Performed by: OPHTHALMOLOGY

## 2021-01-29 PROCEDURE — 92012 PR EYE EXAM, EST PATIENT,INTERMED: ICD-10-PCS | Mod: S$GLB,,, | Performed by: OPHTHALMOLOGY

## 2021-01-29 PROCEDURE — 1126F AMNT PAIN NOTED NONE PRSNT: CPT | Mod: S$GLB,,, | Performed by: OPHTHALMOLOGY

## 2021-01-29 PROCEDURE — 3288F FALL RISK ASSESSMENT DOCD: CPT | Mod: CPTII,S$GLB,, | Performed by: OPHTHALMOLOGY

## 2021-01-29 PROCEDURE — 1101F PT FALLS ASSESS-DOCD LE1/YR: CPT | Mod: CPTII,S$GLB,, | Performed by: OPHTHALMOLOGY

## 2021-02-01 ENCOUNTER — TELEPHONE (OUTPATIENT)
Dept: OPTOMETRY | Facility: CLINIC | Age: 71
End: 2021-02-01

## 2022-06-21 ENCOUNTER — CLINICAL SUPPORT (OUTPATIENT)
Dept: PREADMISSION TESTING | Facility: HOSPITAL | Age: 72
End: 2022-06-21
Attending: OPHTHALMOLOGY
Payer: MEDICARE

## 2022-06-21 DIAGNOSIS — Z01.818 OTHER SPECIFIED PRE-OPERATIVE EXAMINATION: Primary | ICD-10-CM

## 2022-06-21 LAB
ANION GAP SERPL CALC-SCNC: 13 MEQ/L
BUN SERPL-MCNC: 26.6 MG/DL (ref 9.8–20.1)
CALCIUM SERPL-MCNC: 11.6 MG/DL (ref 8.4–10.2)
CHLORIDE SERPL-SCNC: 104 MMOL/L (ref 98–107)
CO2 SERPL-SCNC: 21 MMOL/L (ref 23–31)
CREAT SERPL-MCNC: 2.24 MG/DL (ref 0.55–1.02)
CREAT/UREA NIT SERPL: 12
GLUCOSE SERPL-MCNC: 196 MG/DL (ref 82–115)
POTASSIUM SERPL-SCNC: 4.9 MMOL/L (ref 3.5–5.1)
SODIUM SERPL-SCNC: 138 MMOL/L (ref 136–145)

## 2022-06-21 PROCEDURE — 36415 COLL VENOUS BLD VENIPUNCTURE: CPT

## 2024-09-24 ENCOUNTER — ANESTHESIA (OUTPATIENT)
Facility: HOSPITAL | Age: 74
End: 2024-09-24
Payer: MEDICARE

## 2024-09-24 ENCOUNTER — HOSPITAL ENCOUNTER (OUTPATIENT)
Facility: HOSPITAL | Age: 74
Discharge: HOME | End: 2024-09-24
Attending: OPHTHALMOLOGY | Admitting: OPHTHALMOLOGY
Payer: MEDICARE

## 2024-09-24 ENCOUNTER — ANESTHESIA EVENT (OUTPATIENT)
Facility: HOSPITAL | Age: 74
End: 2024-09-24
Payer: MEDICARE

## 2024-09-24 VITALS
RESPIRATION RATE: 14 BRPM | TEMPERATURE: 97 F | BODY MASS INDEX: 27.97 KG/M2 | HEIGHT: 66 IN | OXYGEN SATURATION: 96 % | HEART RATE: 87 BPM | DIASTOLIC BLOOD PRESSURE: 105 MMHG | SYSTOLIC BLOOD PRESSURE: 190 MMHG | WEIGHT: 174 LBS

## 2024-09-24 DIAGNOSIS — Z94.7 CORNEA REPLACED BY TRANSPLANT: Primary | ICD-10-CM

## 2024-09-24 LAB
ANION GAP SERPL CALC-SCNC: 17 MMOL/L (ref 8–16)
BUN SERPL-MCNC: 38 MG/DL (ref 6–30)
CHLORIDE SERPL-SCNC: 107 MMOL/L (ref 95–110)
CREAT SERPL-MCNC: 2 MG/DL (ref 0.5–1.4)
GLUCOSE SERPL-MCNC: 179 MG/DL (ref 70–110)
HCT VFR BLD CALC: 46 %PCV (ref 36–54)
HGB BLD-MCNC: 16 G/DL
POC IONIZED CALCIUM: 1.33 MMOL/L (ref 1.06–1.42)
POC TCO2 (MEASURED): 23 MMOL/L (ref 23–29)
POCT GLUCOSE: 174 MG/DL (ref 70–110)
POTASSIUM BLD-SCNC: 4.7 MMOL/L (ref 3.5–5.1)
SAMPLE: ABNORMAL
SODIUM BLD-SCNC: 141 MMOL/L (ref 136–145)

## 2024-09-24 PROCEDURE — 25000003 PHARM REV CODE 250: Performed by: NURSE ANESTHETIST, CERTIFIED REGISTERED

## 2024-09-24 PROCEDURE — 37000008 HC ANESTHESIA 1ST 15 MINUTES: Performed by: OPHTHALMOLOGY

## 2024-09-24 PROCEDURE — 63600175 PHARM REV CODE 636 W HCPCS: Performed by: OPHTHALMOLOGY

## 2024-09-24 PROCEDURE — 27201423 OPTIME MED/SURG SUP & DEVICES STERILE SUPPLY: Performed by: OPHTHALMOLOGY

## 2024-09-24 PROCEDURE — 37000009 HC ANESTHESIA EA ADD 15 MINS: Performed by: OPHTHALMOLOGY

## 2024-09-24 PROCEDURE — 71000015 HC POSTOP RECOV 1ST HR: Performed by: OPHTHALMOLOGY

## 2024-09-24 PROCEDURE — 71000016 HC POSTOP RECOV ADDL HR: Performed by: OPHTHALMOLOGY

## 2024-09-24 PROCEDURE — V2785 CORNEAL TISSUE PROCESSING: HCPCS | Performed by: OPHTHALMOLOGY

## 2024-09-24 PROCEDURE — 36000707: Performed by: OPHTHALMOLOGY

## 2024-09-24 PROCEDURE — 63600175 PHARM REV CODE 636 W HCPCS: Mod: JZ

## 2024-09-24 PROCEDURE — 25000003 PHARM REV CODE 250: Performed by: OPHTHALMOLOGY

## 2024-09-24 PROCEDURE — 63600175 PHARM REV CODE 636 W HCPCS: Performed by: NURSE ANESTHETIST, CERTIFIED REGISTERED

## 2024-09-24 PROCEDURE — 71000033 HC RECOVERY, INTIAL HOUR: Performed by: OPHTHALMOLOGY

## 2024-09-24 PROCEDURE — 36000706: Performed by: OPHTHALMOLOGY

## 2024-09-24 DEVICE — IMPLANT CORNEA NOT CUT: Type: IMPLANTABLE DEVICE | Site: EYE | Status: FUNCTIONAL

## 2024-09-24 RX ORDER — ONDANSETRON HYDROCHLORIDE 2 MG/ML
INJECTION, SOLUTION INTRAVENOUS
Status: DISCONTINUED | OUTPATIENT
Start: 2024-09-24 | End: 2024-09-24

## 2024-09-24 RX ORDER — LABETALOL HYDROCHLORIDE 5 MG/ML
10 INJECTION, SOLUTION INTRAVENOUS ONCE
Status: COMPLETED | OUTPATIENT
Start: 2024-09-24 | End: 2024-09-24

## 2024-09-24 RX ORDER — LIDOCAINE HYDROCHLORIDE 20 MG/ML
INJECTION INTRAVENOUS
Status: DISCONTINUED | OUTPATIENT
Start: 2024-09-24 | End: 2024-09-24

## 2024-09-24 RX ORDER — LIDOCAINE HYDROCHLORIDE 40 MG/ML
INJECTION, SOLUTION RETROBULBAR
Status: DISCONTINUED
Start: 2024-09-24 | End: 2024-09-24 | Stop reason: HOSPADM

## 2024-09-24 RX ORDER — DEXAMETHASONE SODIUM PHOSPHATE 4 MG/ML
INJECTION, SOLUTION INTRA-ARTICULAR; INTRALESIONAL; INTRAMUSCULAR; INTRAVENOUS; SOFT TISSUE
Status: DISCONTINUED | OUTPATIENT
Start: 2024-09-24 | End: 2024-09-24

## 2024-09-24 RX ORDER — DEXAMETHASONE SODIUM PHOSPHATE 10 MG/ML
INJECTION INTRAMUSCULAR; INTRAVENOUS
Status: DISCONTINUED
Start: 2024-09-24 | End: 2024-09-24 | Stop reason: HOSPADM

## 2024-09-24 RX ORDER — LABETALOL HYDROCHLORIDE 5 MG/ML
20 INJECTION, SOLUTION INTRAVENOUS ONCE
Status: DISCONTINUED | OUTPATIENT
Start: 2024-09-24 | End: 2024-09-24

## 2024-09-24 RX ORDER — GLUCAGON 1 MG
1 KIT INJECTION
Status: DISCONTINUED | OUTPATIENT
Start: 2024-09-24 | End: 2024-09-24 | Stop reason: HOSPADM

## 2024-09-24 RX ORDER — POVIDONE-IODINE 5 %
SOLUTION, NON-ORAL OPHTHALMIC (EYE)
Status: DISCONTINUED | OUTPATIENT
Start: 2024-09-24 | End: 2024-09-24 | Stop reason: HOSPADM

## 2024-09-24 RX ORDER — POVIDONE-IODINE 5 %
SOLUTION, NON-ORAL OPHTHALMIC (EYE)
Status: DISCONTINUED
Start: 2024-09-24 | End: 2024-09-24 | Stop reason: HOSPADM

## 2024-09-24 RX ORDER — PROPOFOL 10 MG/ML
INJECTION, EMULSION INTRAVENOUS
Status: DISCONTINUED | OUTPATIENT
Start: 2024-09-24 | End: 2024-09-24

## 2024-09-24 RX ORDER — GLYCOPYRROLATE 0.2 MG/ML
INJECTION INTRAMUSCULAR; INTRAVENOUS
Status: DISCONTINUED | OUTPATIENT
Start: 2024-09-24 | End: 2024-09-24

## 2024-09-24 RX ORDER — MIDAZOLAM HYDROCHLORIDE 1 MG/ML
INJECTION INTRAMUSCULAR; INTRAVENOUS
Status: DISCONTINUED | OUTPATIENT
Start: 2024-09-24 | End: 2024-09-24

## 2024-09-24 RX ORDER — SODIUM CHLORIDE, SODIUM LACTATE, POTASSIUM CHLORIDE, CALCIUM CHLORIDE 600; 310; 30; 20 MG/100ML; MG/100ML; MG/100ML; MG/100ML
INJECTION, SOLUTION INTRAVENOUS CONTINUOUS
Status: DISCONTINUED | OUTPATIENT
Start: 2024-09-24 | End: 2024-09-24 | Stop reason: HOSPADM

## 2024-09-24 RX ORDER — DEXAMETHASONE SODIUM PHOSPHATE 10 MG/ML
INJECTION INTRAMUSCULAR; INTRAVENOUS
Status: DISCONTINUED | OUTPATIENT
Start: 2024-09-24 | End: 2024-09-24 | Stop reason: HOSPADM

## 2024-09-24 RX ORDER — NEOMYCIN SULFATE, POLYMYXIN B SULFATE, AND DEXAMETHASONE 3.5; 10000; 1 MG/G; [USP'U]/G; MG/G
OINTMENT OPHTHALMIC
Status: DISCONTINUED
Start: 2024-09-24 | End: 2024-09-24 | Stop reason: WASHOUT

## 2024-09-24 RX ORDER — PROCHLORPERAZINE EDISYLATE 5 MG/ML
5 INJECTION INTRAMUSCULAR; INTRAVENOUS EVERY 30 MIN PRN
Status: DISCONTINUED | OUTPATIENT
Start: 2024-09-24 | End: 2024-09-24 | Stop reason: HOSPADM

## 2024-09-24 RX ORDER — CEFAZOLIN SODIUM 1 G/3ML
INJECTION, POWDER, FOR SOLUTION INTRAMUSCULAR; INTRAVENOUS
Status: DISCONTINUED | OUTPATIENT
Start: 2024-09-24 | End: 2024-09-24 | Stop reason: HOSPADM

## 2024-09-24 RX ORDER — ONDANSETRON 4 MG/1
8 TABLET, ORALLY DISINTEGRATING ORAL EVERY 6 HOURS PRN
OUTPATIENT
Start: 2024-09-24

## 2024-09-24 RX ORDER — IPRATROPIUM BROMIDE AND ALBUTEROL SULFATE 2.5; .5 MG/3ML; MG/3ML
3 SOLUTION RESPIRATORY (INHALATION)
Status: DISCONTINUED | OUTPATIENT
Start: 2024-09-24 | End: 2024-09-24 | Stop reason: HOSPADM

## 2024-09-24 RX ORDER — MIDAZOLAM HYDROCHLORIDE 2 MG/2ML
0.5 INJECTION, SOLUTION INTRAMUSCULAR; INTRAVENOUS ONCE AS NEEDED
OUTPATIENT
Start: 2024-09-24 | End: 2036-02-21

## 2024-09-24 RX ORDER — CEFAZOLIN SODIUM 1 G/3ML
INJECTION, POWDER, FOR SOLUTION INTRAMUSCULAR; INTRAVENOUS
Status: DISCONTINUED
Start: 2024-09-24 | End: 2024-09-24 | Stop reason: HOSPADM

## 2024-09-24 RX ORDER — ONDANSETRON HYDROCHLORIDE 2 MG/ML
4 INJECTION, SOLUTION INTRAVENOUS DAILY PRN
Status: DISCONTINUED | OUTPATIENT
Start: 2024-09-24 | End: 2024-09-24 | Stop reason: HOSPADM

## 2024-09-24 RX ORDER — EPHEDRINE SULFATE 50 MG/ML
INJECTION, SOLUTION INTRAVENOUS
Status: DISCONTINUED | OUTPATIENT
Start: 2024-09-24 | End: 2024-09-24

## 2024-09-24 RX ORDER — PILOCARPINE HYDROCHLORIDE 10 MG/ML
1 SOLUTION/ DROPS OPHTHALMIC
Status: COMPLETED | OUTPATIENT
Start: 2024-09-24 | End: 2024-09-24

## 2024-09-24 RX ORDER — EPINEPHRINE 1 MG/ML
INJECTION INTRAMUSCULAR; INTRAVENOUS; SUBCUTANEOUS
Status: DISCONTINUED | OUTPATIENT
Start: 2024-09-24 | End: 2024-09-24 | Stop reason: HOSPADM

## 2024-09-24 RX ORDER — HYDROMORPHONE HYDROCHLORIDE 2 MG/ML
0.2 INJECTION, SOLUTION INTRAMUSCULAR; INTRAVENOUS; SUBCUTANEOUS EVERY 5 MIN PRN
Status: DISCONTINUED | OUTPATIENT
Start: 2024-09-24 | End: 2024-09-24 | Stop reason: HOSPADM

## 2024-09-24 RX ORDER — FENTANYL CITRATE 50 UG/ML
INJECTION, SOLUTION INTRAMUSCULAR; INTRAVENOUS
Status: DISCONTINUED | OUTPATIENT
Start: 2024-09-24 | End: 2024-09-24

## 2024-09-24 RX ORDER — BUPIVACAINE HYDROCHLORIDE 7.5 MG/ML
INJECTION, SOLUTION EPIDURAL; RETROBULBAR
Status: DISCONTINUED
Start: 2024-09-24 | End: 2024-09-24 | Stop reason: HOSPADM

## 2024-09-24 RX ORDER — MEPERIDINE HYDROCHLORIDE 25 MG/ML
12.5 INJECTION INTRAMUSCULAR; INTRAVENOUS; SUBCUTANEOUS EVERY 10 MIN PRN
Status: DISCONTINUED | OUTPATIENT
Start: 2024-09-24 | End: 2024-09-24 | Stop reason: HOSPADM

## 2024-09-24 RX ORDER — LIDOCAINE HYDROCHLORIDE 10 MG/ML
1 INJECTION, SOLUTION EPIDURAL; INFILTRATION; INTRACAUDAL; PERINEURAL ONCE
OUTPATIENT
Start: 2024-09-24 | End: 2024-09-24

## 2024-09-24 RX ORDER — SODIUM CHLORIDE, SODIUM GLUCONATE, SODIUM ACETATE, POTASSIUM CHLORIDE AND MAGNESIUM CHLORIDE 30; 37; 368; 526; 502 MG/100ML; MG/100ML; MG/100ML; MG/100ML; MG/100ML
INJECTION, SOLUTION INTRAVENOUS CONTINUOUS
OUTPATIENT
Start: 2024-09-24 | End: 2024-10-24

## 2024-09-24 RX ORDER — PHENYLEPHRINE HCL IN 0.9% NACL 1 MG/10 ML
SYRINGE (ML) INTRAVENOUS
Status: DISCONTINUED | OUTPATIENT
Start: 2024-09-24 | End: 2024-09-24

## 2024-09-24 RX ORDER — LABETALOL HCL 20 MG/4 ML
SYRINGE (ML) INTRAVENOUS
Status: COMPLETED
Start: 2024-09-24 | End: 2024-09-24

## 2024-09-24 RX ORDER — ROCURONIUM BROMIDE 10 MG/ML
INJECTION, SOLUTION INTRAVENOUS
Status: DISCONTINUED | OUTPATIENT
Start: 2024-09-24 | End: 2024-09-24

## 2024-09-24 RX ORDER — BUPIVACAINE HYDROCHLORIDE 7.5 MG/ML
INJECTION, SOLUTION EPIDURAL; RETROBULBAR
Status: DISCONTINUED | OUTPATIENT
Start: 2024-09-24 | End: 2024-09-24 | Stop reason: HOSPADM

## 2024-09-24 RX ORDER — EPINEPHRINE 1 MG/ML
INJECTION, SOLUTION, CONCENTRATE INTRAVENOUS
Status: DISCONTINUED
Start: 2024-09-24 | End: 2024-09-24 | Stop reason: HOSPADM

## 2024-09-24 RX ORDER — OFLOXACIN 3 MG/ML
1 SOLUTION/ DROPS OPHTHALMIC
Status: COMPLETED | OUTPATIENT
Start: 2024-09-24 | End: 2024-09-24

## 2024-09-24 RX ORDER — HYDROMORPHONE HYDROCHLORIDE 2 MG/ML
0.4 INJECTION, SOLUTION INTRAMUSCULAR; INTRAVENOUS; SUBCUTANEOUS EVERY 5 MIN PRN
Status: DISCONTINUED | OUTPATIENT
Start: 2024-09-24 | End: 2024-09-24 | Stop reason: HOSPADM

## 2024-09-24 RX ADMIN — OFLOXACIN 1 DROP: 3 SOLUTION/ DROPS OPHTHALMIC at 11:09

## 2024-09-24 RX ADMIN — MIDAZOLAM 1 MG: 1 INJECTION INTRAMUSCULAR; INTRAVENOUS at 12:09

## 2024-09-24 RX ADMIN — FENTANYL CITRATE 100 MCG: 50 INJECTION INTRAMUSCULAR; INTRAVENOUS at 12:09

## 2024-09-24 RX ADMIN — PILOCARPINE HYDROCHLORIDE 1 DROP: 10 SOLUTION/ DROPS OPHTHALMIC at 11:09

## 2024-09-24 RX ADMIN — GLYCOPYRROLATE 0.2 MG: 0.2 INJECTION INTRAMUSCULAR; INTRAVENOUS at 12:09

## 2024-09-24 RX ADMIN — EPHEDRINE SULFATE 10 MG: 50 INJECTION INTRAVENOUS at 12:09

## 2024-09-24 RX ADMIN — SUGAMMADEX 100 MG: 100 INJECTION, SOLUTION INTRAVENOUS at 01:09

## 2024-09-24 RX ADMIN — LABETALOL HYDROCHLORIDE 10 MG: 5 INJECTION, SOLUTION INTRAVENOUS at 02:09

## 2024-09-24 RX ADMIN — ONDANSETRON HYDROCHLORIDE 4 MG: 2 SOLUTION INTRAMUSCULAR; INTRAVENOUS at 12:09

## 2024-09-24 RX ADMIN — Medication 100 MCG: at 12:09

## 2024-09-24 RX ADMIN — EPHEDRINE SULFATE 15 MG: 50 INJECTION INTRAVENOUS at 12:09

## 2024-09-24 RX ADMIN — EPHEDRINE SULFATE 25 MG: 50 INJECTION INTRAVENOUS at 12:09

## 2024-09-24 RX ADMIN — LIDOCAINE HYDROCHLORIDE 50 MG: 20 INJECTION INTRAVENOUS at 12:09

## 2024-09-24 RX ADMIN — ROCURONIUM BROMIDE 10 MG: 10 INJECTION, SOLUTION INTRAVENOUS at 12:09

## 2024-09-24 RX ADMIN — DEXAMETHASONE SODIUM PHOSPHATE 4 MG: 4 INJECTION, SOLUTION INTRA-ARTICULAR; INTRALESIONAL; INTRAMUSCULAR; INTRAVENOUS; SOFT TISSUE at 12:09

## 2024-09-24 RX ADMIN — ROCURONIUM BROMIDE 15 MG: 10 INJECTION, SOLUTION INTRAVENOUS at 01:09

## 2024-09-24 RX ADMIN — PROPOFOL 150 MG: 10 INJECTION, EMULSION INTRAVENOUS at 12:09

## 2024-09-24 RX ADMIN — SODIUM CHLORIDE, POTASSIUM CHLORIDE, SODIUM LACTATE AND CALCIUM CHLORIDE: 600; 310; 30; 20 INJECTION, SOLUTION INTRAVENOUS at 11:09

## 2024-09-24 RX ADMIN — PROPOFOL 50 MG: 10 INJECTION, EMULSION INTRAVENOUS at 12:09

## 2024-09-24 NOTE — DISCHARGE SUMMARY
[facility] Surgical - Periop Services 2nd Floor  Discharge Note  Short Stay    Procedure(s) (LRB):  PK - OS - LMA (Left)      OUTCOME: Patient tolerated treatment/procedure well without complication and is now ready for discharge.    DISPOSITION: Home or Self Care    FINAL DIAGNOSIS:  Penetrating Keratoplasty Left eye    FOLLOWUP: In clinic    DISCHARGE INSTRUCTIONS:  Review patient's discharge instructions in chart      Clinical Reference Documents Added to Patient Instructions         Document    CORNEAL TRANSPLANT (ENGLISH)            TIME SPENT ON DISCHARGE: 5 minutes

## 2024-09-24 NOTE — ANESTHESIA PROCEDURE NOTES
Intubation    Date/Time: 9/24/2024 12:07 PM    Performed by: Inna Carreon CRNA  Authorized by: Nickolas Currie Jr., MD    Intubation:     Induction:  Intravenous    Intubated:  Postinduction    Mask Ventilation:  Easy mask    Attempts:  1    Attempted By:  CRNA    Method of Intubation:  Blind intubation    Difficult Airway Encountered?: No      Complications:  None    Airway Device:  Supraglottic airway/LMA    Airway Device Size:  3.0    Style/Cuff Inflation:  Cuffed (inflated to minimal occlusive pressure)    Secured at:  The teeth    Placement Verified By:  Capnometry    Complicating Factors:  None    Findings Post-Intubation:  BS equal bilateral

## 2024-09-24 NOTE — TRANSFER OF CARE
"Anesthesia Transfer of Care Note    Patient: Willie Babin Cooks    Procedure(s) Performed: Procedure(s) (LRB):  PK - OS - LMA (Left)    Patient location: PACU    Anesthesia Type: general    Transport from OR: Transported from OR on room air with adequate spontaneous ventilation    Post pain: adequate analgesia    Post assessment: no apparent anesthetic complications and tolerated procedure well    Post vital signs: stable    Level of consciousness: responds to stimulation    Nausea/Vomiting: no nausea/vomiting    Complications: none    Transfer of care protocol was followed      Last vitals: Visit Vitals  BP (!) 182/89   Pulse 87   Temp 36 °C (96.8 °F) (Temporal)   Resp 14   Ht 5' 6" (1.676 m)   Wt 78.9 kg (174 lb)   SpO2 96%   Breastfeeding No   BMI 28.08 kg/m²     "

## 2024-09-24 NOTE — ANESTHESIA PREPROCEDURE EVALUATION
"                                                                                                             09/24/2024  Willie Babin Cooks is a 74 y.o., female with PONV presents as an outpatient for left corneal transplant.    Left heart catheterization 2020   Normal coronary arteries   LVEDP 19    Last 3 sets of Vitals        7/23/2024     6:36 AM 7/23/2024     7:49 AM 9/24/2024    11:01 AM   Vitals - 1 value per visit   SYSTOLIC   182   DIASTOLIC   89   Pulse   87   Temp   36.4 °C (97.5 °F)   Resp  15    SPO2   96 %   Weight (lb) 173.94     Weight (kg) 78.9     Height 5' 6" (1.676 m)     BMI (Calculated) 28.1         Pre-op Assessment    I have reviewed the Patient Summary Reports.    I have reviewed the NPO Status.   I have reviewed the Medications.     Review of Systems  Anesthesia Hx:               Denies Personal Hx of Anesthesia complications.                    Social:  Non-Smoker       Cardiovascular:     Hypertension              ECG has been reviewed.  Functional Capacity 2 METS, Primarily uses wheelchair                         Renal/:  Chronic Renal Disease, CKD                Hepatic/GI:     GERD, well controlled             Endocrine:  Diabetes, type 2               Physical Exam  General: Well nourished, Cooperative, Alert and Oriented    Airway:  Mouth Opening: Normal  TM Distance: Normal  Tongue: Normal  Neck ROM: Normal ROM    Dental:  Edentulous    Chest/Lungs:  Clear to auscultation, Normal Respiratory Rate    Heart:  Rate: Normal  Rhythm: Regular Rhythm        Anesthesia Plan  Type of Anesthesia, risks & benefits discussed:    Anesthesia Type: Gen Supraglottic Airway  Intra-op Monitoring Plan: Standard ASA Monitors  Post Op Pain Control Plan: multimodal analgesia and IV/PO Opioids PRN  Induction:  IV  Airway Plan: Direct  Informed Consent: Informed consent signed with the Patient and all parties understand the risks and agree with anesthesia plan.  All questions answered.   ASA Score: " 3  Day of Surgery Review of History & Physical: H&P Update referred to the surgeon/provider.    Ready For Surgery From Anesthesia Perspective.     .

## 2024-09-24 NOTE — OP NOTE
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  The patient successfully underwent general endotracheal anesthesia.  The patient was prepped and draped in sterile ophthalmic fashion.  Lid speculum was placed in the operative eye and the operating microscope was brought into place.  The cornea was measured using calipers.  The optical center of the cornea was marked using a marking pen.  Attention was turned to the side table where the donor cornea was identified.  The donor cornea was cut using a 8.75 mm trephine.  The failed recipient corneal button was removed using left and right corneal scissors. The anterior chamber was filled with Healon. The donor cornea was brought over the patient's eye.  Donor cornea was sutured into place using 16 interrupted 10-0 Nylon sutures.  The remaining Healon was burped out of the anterior chamber using filtered BSS.  The chamber was noted to be formed, the sulcus lens was in good position, and the wounds were water-tight.  Subconjunctival antibiotics and dexamethasone were injected inferonasally.  The lid speculum was removed.  Topical ointment and antibiotic eye drops were applied to the eye.  A 8.6 -0.25 bandage contact lens was placed on the patient's eye. The patient was extubated without complication.  The patient was brought to PACU in good condition.     Abelardo RAIN  MD Lino

## 2024-09-24 NOTE — ANESTHESIA POSTPROCEDURE EVALUATION
Anesthesia Post Evaluation    Patient: Willie Babin Cooks    Procedure(s) Performed: Procedure(s) (LRB):  PK - OS - LMA (Left)    Final Anesthesia Type: general      Patient location during evaluation: floor  Patient participation: Yes- Able to Participate  Level of consciousness: awake and alert  Post-procedure vital signs: reviewed and stable  Pain management: adequate  Airway patency: patent    PONV status at discharge: No PONV  Anesthetic complications: no      Cardiovascular status: blood pressure returned to baseline  Respiratory status: spontaneous ventilation and room air  Hydration status: euvolemic  Follow-up not needed.              Vitals Value Taken Time   /90 09/24/24 1514   Temp  09/24/24 1628   Pulse 86 09/24/24 1529   Resp  09/24/24 1628   SpO2 96 % 09/24/24 1529   Vitals shown include unfiled device data.      Event Time   Out of Recovery 14:35:00         Pain/Nayeli Score: Nayeli Score: 10 (9/24/2024  2:51 PM)

## 2024-09-26 NOTE — DISCHARGE SUMMARY
[facility] Surgical - Periop Services 2nd Floor  Discharge Note  Short Stay    Procedure(s) (LRB):  PK - OS - LMA (Left)      OUTCOME: Patient tolerated treatment/procedure well without complication and is now ready for discharge.    DISPOSITION: Home or Self Care    FINAL DIAGNOSIS:  Penetrating Keratoplasty Left eye     FOLLOWUP: In clinic    DISCHARGE INSTRUCTIONS:  No discharge procedures on file.      Clinical Reference Documents Added to Patient Instructions         Document    CORNEAL TRANSPLANT (ENGLISH)            TIME SPENT ON DISCHARGE: 5 minutes

## (undated) DEVICE — SUT ETHILON 10/0 CS160-6

## (undated) DEVICE — SKIN MARKER DEVON 160

## (undated) DEVICE — SOL BETADINE 5%

## (undated) DEVICE — Device

## (undated) DEVICE — GLOVE BIOGEL SKINSENSE PI 7.5

## (undated) DEVICE — ENDOSERTER CRNL ENDOTHM INSTR

## (undated) DEVICE — SYR 10CC LUER LOCK

## (undated) DEVICE — SUT ETHILON 10-0 CS175-8 12IN

## (undated) DEVICE — CANNULA ANTERIOR CHAMBER 30G

## (undated) DEVICE — CONTAINER SPECIMEN SCREW 4OZ

## (undated) DEVICE — PUNCH BARRON VACUUM 8.50M

## (undated) DEVICE — SYR 3CC 21 X 1 LUER LOCK

## (undated) DEVICE — SYR TB 1ML 26GA 3/8IN BLS

## (undated) DEVICE — EYE SHIELD UNIVERSAL

## (undated) DEVICE — PUNCH 8.0 BARRON DONOR

## (undated) DEVICE — SUTURE NYLON 10-0  12/BX

## (undated) DEVICE — TREPHINE BARRON RADIAL 7.75MM

## (undated) DEVICE — SUPPORT ULNA NERVE PROTECTOR

## (undated) DEVICE — NDL HYPO A BEVEL 30X1/2

## (undated) DEVICE — SYR 3ML LL 18GA 1.5IN

## (undated) DEVICE — NDL RETROBULAR AKTKINSON 23G

## (undated) DEVICE — TANK GAS ISPAN 125GR FOR SF6

## (undated) DEVICE — CANNULA IRR ANTR 27G X 7/8

## (undated) DEVICE — TREPHINE BARRON VAC RAD 8MM

## (undated) DEVICE — SHIELD EYE PLASTIC CLEAR ADLT

## (undated) DEVICE — DRESSING TRANS 2X2 TEGADERM

## (undated) DEVICE — GLOVE SENSICARE PI MICRO 7

## (undated) DEVICE — PAD EYE OVAL STRL 1 5/8X 2 5/8

## (undated) DEVICE — PAD EYE L STRL  2.125X2.625IN

## (undated) DEVICE — ADAPTER DUAL NSL LUER M-M 7FT

## (undated) DEVICE — SYR W NDL BLN FILL 5ML 18GX1.5

## (undated) DEVICE — SUT 10/0 12IN 2MI-175 .5

## (undated) DEVICE — SPONGE WEC CEL SPEARS

## (undated) DEVICE — GOWN POLY REINF BRTH SLV XL